# Patient Record
Sex: FEMALE | Race: BLACK OR AFRICAN AMERICAN | Employment: UNEMPLOYED | URBAN - METROPOLITAN AREA
[De-identification: names, ages, dates, MRNs, and addresses within clinical notes are randomized per-mention and may not be internally consistent; named-entity substitution may affect disease eponyms.]

---

## 2019-02-11 ENCOUNTER — OFFICE VISIT (OUTPATIENT)
Dept: NEUROSURGERY | Age: 59
End: 2019-02-11
Payer: COMMERCIAL

## 2019-02-11 VITALS
HEART RATE: 83 BPM | SYSTOLIC BLOOD PRESSURE: 136 MMHG | BODY MASS INDEX: 32.37 KG/M2 | WEIGHT: 177 LBS | RESPIRATION RATE: 20 BRPM | DIASTOLIC BLOOD PRESSURE: 83 MMHG

## 2019-02-11 DIAGNOSIS — M47.26 OTHER SPONDYLOSIS WITH RADICULOPATHY, LUMBAR REGION: Primary | ICD-10-CM

## 2019-02-11 PROCEDURE — 4004F PT TOBACCO SCREEN RCVD TLK: CPT | Performed by: NEUROLOGICAL SURGERY

## 2019-02-11 PROCEDURE — 3017F COLORECTAL CA SCREEN DOC REV: CPT | Performed by: NEUROLOGICAL SURGERY

## 2019-02-11 PROCEDURE — G8484 FLU IMMUNIZE NO ADMIN: HCPCS | Performed by: NEUROLOGICAL SURGERY

## 2019-02-11 PROCEDURE — G8419 CALC BMI OUT NRM PARAM NOF/U: HCPCS | Performed by: NEUROLOGICAL SURGERY

## 2019-02-11 PROCEDURE — G8427 DOCREV CUR MEDS BY ELIG CLIN: HCPCS | Performed by: NEUROLOGICAL SURGERY

## 2019-02-11 PROCEDURE — 99203 OFFICE O/P NEW LOW 30 MIN: CPT | Performed by: NEUROLOGICAL SURGERY

## 2019-11-10 ENCOUNTER — OFFICE VISIT (OUTPATIENT)
Dept: PRIMARY CARE CLINIC | Age: 59
End: 2019-11-10
Payer: COMMERCIAL

## 2019-11-10 VITALS
SYSTOLIC BLOOD PRESSURE: 120 MMHG | OXYGEN SATURATION: 99 % | BODY MASS INDEX: 32.76 KG/M2 | DIASTOLIC BLOOD PRESSURE: 77 MMHG | HEART RATE: 66 BPM | HEIGHT: 62 IN | TEMPERATURE: 97.9 F | WEIGHT: 178 LBS

## 2019-11-10 DIAGNOSIS — N30.00 ACUTE CYSTITIS WITHOUT HEMATURIA: Primary | ICD-10-CM

## 2019-11-10 LAB
BILIRUBIN, POC: NEGATIVE
BLOOD URINE, POC: NEGATIVE
CLARITY, POC: ABNORMAL
COLOR, POC: YELLOW
GLUCOSE URINE, POC: NEGATIVE
KETONES, POC: NEGATIVE
LEUKOCYTE EST, POC: NEGATIVE
NITRITE, POC: NEGATIVE
PH, POC: 7
PROTEIN, POC: ABNORMAL
SPECIFIC GRAVITY, POC: 1.02
UROBILINOGEN, POC: NEGATIVE

## 2019-11-10 PROCEDURE — 81003 URINALYSIS AUTO W/O SCOPE: CPT | Performed by: FAMILY MEDICINE

## 2019-11-10 PROCEDURE — G8417 CALC BMI ABV UP PARAM F/U: HCPCS | Performed by: FAMILY MEDICINE

## 2019-11-10 PROCEDURE — G8484 FLU IMMUNIZE NO ADMIN: HCPCS | Performed by: FAMILY MEDICINE

## 2019-11-10 PROCEDURE — 3017F COLORECTAL CA SCREEN DOC REV: CPT | Performed by: FAMILY MEDICINE

## 2019-11-10 PROCEDURE — 99213 OFFICE O/P EST LOW 20 MIN: CPT | Performed by: FAMILY MEDICINE

## 2019-11-10 PROCEDURE — G8427 DOCREV CUR MEDS BY ELIG CLIN: HCPCS | Performed by: FAMILY MEDICINE

## 2019-11-10 PROCEDURE — 1036F TOBACCO NON-USER: CPT | Performed by: FAMILY MEDICINE

## 2019-11-10 RX ORDER — FLUTICASONE PROPIONATE 50 MCG
SPRAY, SUSPENSION (ML) NASAL
COMMUNITY

## 2019-11-10 RX ORDER — LANOLIN ALCOHOL/MO/W.PET/CERES
CREAM (GRAM) TOPICAL
COMMUNITY
Start: 2019-10-17

## 2019-11-10 RX ORDER — GLIMEPIRIDE 2 MG/1
TABLET ORAL
COMMUNITY
Start: 2019-08-18

## 2019-11-10 RX ORDER — PAROXETINE 10 MG/1
TABLET, FILM COATED ORAL
COMMUNITY
Start: 2019-10-10

## 2019-11-10 RX ORDER — POLYETHYLENE GLYCOL 3350 17 G/17G
POWDER, FOR SOLUTION ORAL
COMMUNITY

## 2019-11-10 RX ORDER — TRAZODONE HYDROCHLORIDE 50 MG/1
TABLET ORAL
COMMUNITY
Start: 2019-10-10

## 2019-11-10 RX ORDER — PHENAZOPYRIDINE HYDROCHLORIDE 100 MG/1
100 TABLET, FILM COATED ORAL 3 TIMES DAILY PRN
Qty: 6 TABLET | Refills: 0 | Status: SHIPPED | OUTPATIENT
Start: 2019-11-10 | End: 2019-11-12

## 2019-11-10 RX ORDER — SULFAMETHOXAZOLE AND TRIMETHOPRIM 800; 160 MG/1; MG/1
1 TABLET ORAL 2 TIMES DAILY
Qty: 20 TABLET | Refills: 0 | Status: SHIPPED | OUTPATIENT
Start: 2019-11-10 | End: 2019-11-20

## 2019-11-10 RX ORDER — FLUCONAZOLE 150 MG/1
TABLET ORAL
Qty: 2 TABLET | Refills: 1 | Status: SHIPPED | OUTPATIENT
Start: 2019-11-10 | End: 2019-12-04

## 2019-11-10 ASSESSMENT — ENCOUNTER SYMPTOMS
RESPIRATORY NEGATIVE: 1
EYES NEGATIVE: 1
GASTROINTESTINAL NEGATIVE: 1
ALLERGIC/IMMUNOLOGIC NEGATIVE: 1

## 2019-11-10 ASSESSMENT — PATIENT HEALTH QUESTIONNAIRE - PHQ9
2. FEELING DOWN, DEPRESSED OR HOPELESS: 0
SUM OF ALL RESPONSES TO PHQ QUESTIONS 1-9: 0
1. LITTLE INTEREST OR PLEASURE IN DOING THINGS: 0
SUM OF ALL RESPONSES TO PHQ QUESTIONS 1-9: 0
SUM OF ALL RESPONSES TO PHQ9 QUESTIONS 1 & 2: 0

## 2019-12-04 ENCOUNTER — OFFICE VISIT (OUTPATIENT)
Dept: PRIMARY CARE CLINIC | Age: 59
End: 2019-12-04
Payer: COMMERCIAL

## 2019-12-04 VITALS
HEART RATE: 88 BPM | WEIGHT: 176 LBS | BODY MASS INDEX: 32.19 KG/M2 | SYSTOLIC BLOOD PRESSURE: 138 MMHG | TEMPERATURE: 98.2 F | DIASTOLIC BLOOD PRESSURE: 82 MMHG

## 2019-12-04 DIAGNOSIS — Z91.89 AT RISK FOR SIDE EFFECT OF MEDICATION: ICD-10-CM

## 2019-12-04 DIAGNOSIS — L02.412 ABSCESS OF LEFT AXILLA: Primary | ICD-10-CM

## 2019-12-04 PROCEDURE — G8417 CALC BMI ABV UP PARAM F/U: HCPCS | Performed by: NURSE PRACTITIONER

## 2019-12-04 PROCEDURE — G8484 FLU IMMUNIZE NO ADMIN: HCPCS | Performed by: NURSE PRACTITIONER

## 2019-12-04 PROCEDURE — 3017F COLORECTAL CA SCREEN DOC REV: CPT | Performed by: NURSE PRACTITIONER

## 2019-12-04 PROCEDURE — 99202 OFFICE O/P NEW SF 15 MIN: CPT | Performed by: NURSE PRACTITIONER

## 2019-12-04 PROCEDURE — G8427 DOCREV CUR MEDS BY ELIG CLIN: HCPCS | Performed by: NURSE PRACTITIONER

## 2019-12-04 PROCEDURE — 1036F TOBACCO NON-USER: CPT | Performed by: NURSE PRACTITIONER

## 2019-12-04 RX ORDER — DOXYCYCLINE HYCLATE 100 MG
100 TABLET ORAL 2 TIMES DAILY
Qty: 14 TABLET | Refills: 0 | Status: SHIPPED | OUTPATIENT
Start: 2019-12-04 | End: 2019-12-11

## 2019-12-04 RX ORDER — FLUCONAZOLE 150 MG/1
150 TABLET ORAL ONCE
Qty: 1 TABLET | Refills: 0 | Status: SHIPPED | OUTPATIENT
Start: 2019-12-04 | End: 2019-12-04

## 2019-12-04 ASSESSMENT — ENCOUNTER SYMPTOMS
SORE THROAT: 0
VOMITING: 0
ABDOMINAL PAIN: 0
NAUSEA: 0
SHORTNESS OF BREATH: 0
SINUS PAIN: 0
COUGH: 0
DIARRHEA: 0

## 2019-12-30 ENCOUNTER — HOSPITAL ENCOUNTER (OUTPATIENT)
Dept: GENERAL RADIOLOGY | Age: 59
Discharge: HOME | End: 2020-01-01
Payer: COMMERCIAL

## 2019-12-30 ENCOUNTER — OFFICE VISIT (OUTPATIENT)
Dept: ORTHOPEDIC SURGERY | Age: 59
End: 2019-12-30
Payer: COMMERCIAL

## 2019-12-30 ENCOUNTER — OFFICE VISIT (OUTPATIENT)
Dept: PRIMARY CARE CLINIC | Age: 59
End: 2019-12-30
Payer: COMMERCIAL

## 2019-12-30 ENCOUNTER — HOSPITAL ENCOUNTER (OUTPATIENT)
Age: 59
Discharge: HOME | End: 2020-01-01
Payer: COMMERCIAL

## 2019-12-30 VITALS
SYSTOLIC BLOOD PRESSURE: 94 MMHG | WEIGHT: 173 LBS | HEART RATE: 62 BPM | DIASTOLIC BLOOD PRESSURE: 66 MMHG | BODY MASS INDEX: 31.83 KG/M2 | TEMPERATURE: 98.1 F | HEIGHT: 62 IN

## 2019-12-30 VITALS — WEIGHT: 173.06 LBS | BODY MASS INDEX: 31.85 KG/M2 | HEIGHT: 62 IN

## 2019-12-30 DIAGNOSIS — Z12.31 ENCOUNTER FOR SCREENING MAMMOGRAM FOR BREAST CANCER: ICD-10-CM

## 2019-12-30 DIAGNOSIS — S92.424A CLOSED NONDISPLACED FRACTURE OF DISTAL PHALANX OF RIGHT GREAT TOE, INITIAL ENCOUNTER: Primary | ICD-10-CM

## 2019-12-30 DIAGNOSIS — Z13.220 SCREENING FOR HYPERLIPIDEMIA: ICD-10-CM

## 2019-12-30 DIAGNOSIS — M79.674 PAIN OF RIGHT GREAT TOE: Primary | ICD-10-CM

## 2019-12-30 DIAGNOSIS — Z12.11 SCREENING FOR COLON CANCER: ICD-10-CM

## 2019-12-30 PROCEDURE — G8417 CALC BMI ABV UP PARAM F/U: HCPCS | Performed by: INTERNAL MEDICINE

## 2019-12-30 PROCEDURE — 1036F TOBACCO NON-USER: CPT | Performed by: INTERNAL MEDICINE

## 2019-12-30 PROCEDURE — 99203 OFFICE O/P NEW LOW 30 MIN: CPT | Performed by: STUDENT IN AN ORGANIZED HEALTH CARE EDUCATION/TRAINING PROGRAM

## 2019-12-30 PROCEDURE — 73630 X-RAY EXAM OF FOOT: CPT

## 2019-12-30 PROCEDURE — G8427 DOCREV CUR MEDS BY ELIG CLIN: HCPCS | Performed by: INTERNAL MEDICINE

## 2019-12-30 PROCEDURE — 3017F COLORECTAL CA SCREEN DOC REV: CPT | Performed by: INTERNAL MEDICINE

## 2019-12-30 PROCEDURE — G8484 FLU IMMUNIZE NO ADMIN: HCPCS | Performed by: INTERNAL MEDICINE

## 2019-12-30 PROCEDURE — 99202 OFFICE O/P NEW SF 15 MIN: CPT | Performed by: INTERNAL MEDICINE

## 2019-12-30 NOTE — PROGRESS NOTES
[facility]  [facility]  [address]  [place]  Dept: [phone]    Ambulatory Orthopedic Office Visit, NEW PATIENT      CHIEF COMPLAINT:    Chief Complaint   Patient presents with    Toe Injury     right big toe DOI 19       HISTORY OF PRESENT ILLNESS:      The patient is a 61 y. o.female who is being seen at the request of  Anuel Nichole MD for consultation and evaluation of  Right great toe fracture. Pt hit toe early this morning while walking around house in dark. Pain controlled to great toe currently. XRs at urgent  show nondisplaced 1st proximal phalanx fracture. Pt denies pain elsewhere. Past Medical History:    Past Medical History:   Diagnosis Date    Asthma     Back pain     Diabetes mellitus ([address]     GERD (gastroesophageal reflux disease)     Glaucoma     Hyperlipidemia     Hypertension     Osteoarthritis        Past Surgical History:    Past Surgical History:   Procedure Laterality Date    APPENDECTOMY      BACK SURGERY      HYSTERECTOMY      JOINT REPLACEMENT Bilateral     knee x2 each    LUMBAR FUSION       [facility]     TONSILLECTOMY         Current Medications:   Current Outpatient Medications   Medication Sig Dispense Refill    vitamin B-12 (CYANOCOBALAMIN) 1000 MCG tablet       ferrous sulfate 325 (65 Fe) MG EC tablet       fluticasone (FLONASE) 50 MCG/ACT nasal spray fluticasone propionate 50 mcg/actuation nasal spray,suspension      metFORMIN (GLUCOPHAGE) 500 MG tablet       PARoxetine (PAXIL) 10 MG tablet       polyethylene glycol (GAVILAX) powder Gavilax 17 gram/dose oral powder      timolol (TIMOPTIC) 0.25 % ophthalmic solution       traZODone (DESYREL) 50 MG tablet       albuterol (PROVENTIL) (5 MG/ML) 0.5% nebulizer solution Take 2.5 mg by nebulization every 6 hours as needed for Wheezing.  docusate sodium (COLACE) 100 MG capsule Take 100 mg by mouth daily.       losartan-hydrochlorothiazide (HYZAAR) 100-25 MG per tablet Take 1 tablet by mouth daily.  latanoprost (XALATAN) 0.005 % ophthalmic solution 1 drop nightly.  atorvastatin (LIPITOR) 20 MG tablet Take 20 mg by mouth daily.  amLODIPine (NORVASC) 5 MG tablet Take 5 mg by mouth daily.  oxyCODONE-acetaminophen (PERCOCET) 5-325 MG per tablet Take 1 tablet by mouth every 6 hours as needed for Pain.  Budesonide-Formoterol Fumarate (SYMBICORT IN) Inhale  into the lungs daily as needed. No current facility-administered medications for this visit. Allergies:    Ace inhibitors; Adhesive tape;  Chocolate; and Compazine [prochlorperazine maleate]    Social History:   Social History     Socioeconomic History    Marital status:      Spouse name: Not on file    Number of children: Not on file    Years of education: Not on file    Highest education level: Not on file   Occupational History    Not on file   Social Needs    Financial resource strain: Not on file    Food insecurity:     Worry: Not on file     Inability: Not on file    Transportation needs:     Medical: Not on file     Non-medical: Not on file   Tobacco Use    Smoking status: Former Smoker     Packs/day: 0.25     Years: 10.00     Pack years: 2.50     Types: Cigarettes     Last attempt to quit: 2015     Years since quittin.0    Smokeless tobacco: Never Used   Substance and Sexual Activity    Alcohol use: No    Drug use: Not on file    Sexual activity: Yes     Partners: Male   Lifestyle    Physical activity:     Days per week: Not on file     Minutes per session: Not on file    Stress: Not on file   Relationships    Social connections:     Talks on phone: Not on file     Gets together: Not on file     Attends Sikh service: Not on file     Active member of club or organization: Not on file     Attends meetings of clubs or organizations: Not on file     Relationship status: Not on file    Intimate partner violence:     Fear of current or ex partner: Not on file     Emotionally abused: Not on file     Physically abused: Not on file     Forced sexual activity: Not on file   Other Topics Concern    Not on file   Social History Narrative    Not on file       Family History:  No family history on file. REVIEW OF SYSTEMS:    Constitutional: Negative for fever and chills. HENT: Negative for congestion or drainage   Eyes: Negative for blurred vision and double vision. Respiratory: Negative for cough, shortnessof breath and wheezing. Cardiovascular: Negative for chest pain and palpitations. Gastrointestinal: Negative for nausea. Negative for vomiting. Musculoskeletal: Positive for myalgias and joint pain. Skin:Negative for itching and rash. Neurological: Negative for dizziness, sensory change and headaches. Psychiatric/Behavioral: Negative for depression and suicidal ideas. PHYSICAL EXAM:  Ht 5' 1.69\" (1.567 m)   Wt 173 lb 1 oz (78.5 kg)   BMI 31.97 kg/m²   Physical Exam  Gen: alert and oriented  Psych:  Appropriate affect; Appropriate knowledge base; Appropriate mood; No hallucinations; Head: normocephalic atraumatic   Cardiovascular:Regular rate, no dependent edema, distal pulses 2+  Respiratory: Chest symmetric, no accessory muscle use, normal respirations  Ortho Exam  R foot: pain swelling to proximal right great toe. Compartments soft. 2+ DP pulse. TA/EHL/FHL/GS motor intact. Deep and Superficial Peroneal/Saphenous/Sural SILT. Radiology:   -XRs reviewed outside facility nondisplaced right 1st proximal phalanx fracture    ASSESSMENT:     1. Closed nondisplaced fracture of proximal phalanx of right great toe, initial encounter         PLAN:     -Hard to shoe  -WBAT  -Ice/elevation  -F/u 2 weeks    A  was ordered and placed on the patient for pain control and stabilization due to right great toe fracture.   Patient is ambulatory with weakness and instability therefore a post op shoe will help with the weakness,

## 2020-01-20 ENCOUNTER — OFFICE VISIT (OUTPATIENT)
Dept: ORTHOPEDIC SURGERY | Age: 60
End: 2020-01-20
Payer: COMMERCIAL

## 2020-01-20 VITALS — BODY MASS INDEX: 32.1 KG/M2 | HEIGHT: 61 IN | WEIGHT: 170 LBS

## 2020-01-20 PROCEDURE — 99213 OFFICE O/P EST LOW 20 MIN: CPT | Performed by: ORTHOPAEDIC SURGERY

## 2020-01-20 NOTE — PROGRESS NOTES
right great toe pain. Skin: Negative for itching and rash. Neurological: Negative for dizziness, sensory change and headaches. Psychiatric/Behavioral: Negative for depression and suicidal ideas. I have reviewed the CC, HPI, ROS, PMH, FHX, Social History. I agree with the documentation provided by other staff, residents, and/or medical students and have reviewed their documentation prior to providing my signature indicating agreement. Objective :   General: AAOx3, NAD, appears appropriate stated age  Ortho Exam  RLE-no significant deformity noted at the right big toe. Skin is intact. Tender to palpation about the great toe. Compartments are soft and compressible. EHL/FHL/TA/GS complex motor intact with 5/5 strength. Sural, saphenous, superificial/deep peroneal, and plantar nerve distribution SILT. Dorsalis pedis/posterior tibial pulses 2+ with BCR. CV: no obvious JVD, no dependent edema, distal pulses 2+  Respiratory: chest rise symmetric, unlabored respirations, no audible wheezing  Skin: warm, well perfused, no obvious rashes or lesions  Psych: Patient displays understanding of exam, diagnosis, and plan. Radiology:   History:   Right foot first proximal phalanx fracture    Comparison:   12/30/2019    Findings:   3 x-ray views of the right foot demonstrate acute nondisplaced fracture at the medial portion of the base of the proximal first phalanx. There seems to be no evidence of displacement from prior imaging. No evidence of subluxation or dislocation or any osseous abnormalities. Impression:  Stable fracture at the base of the first proximal phalanx of the right foot    Assessment:      1. Closed nondisplaced fracture of proximal phalanx of right great toe, initial encounter         Plan:      -Hard sole shoe replaced with walking boot for added comfort while ambulating  -Advised to add Motrin for further anti-inflammatory control.  Patient states she will take over the counter  -Discussed with the patient that this type of fracture pattern can be significantly painful. We counseled her that fractures may take a couple months to completely heal.  She displayed understanding of this information.  -Follow up with us in 2 weeks for repeat evaluation with xrays    Follow up:Return in about 2 weeks (around 2/3/2020) for re-evalation with xrays. No orders of the defined types were placed in this encounter. No orders of the defined types were placed in this encounter.       Rickey Desai DO  Orthopedic Surgery Resident, PGY-1  [facility]

## 2020-02-03 ENCOUNTER — OFFICE VISIT (OUTPATIENT)
Dept: ORTHOPEDIC SURGERY | Age: 60
End: 2020-02-03
Payer: COMMERCIAL

## 2020-02-03 VITALS — HEIGHT: 61 IN | WEIGHT: 170 LBS | BODY MASS INDEX: 32.1 KG/M2

## 2020-02-03 PROCEDURE — 99213 OFFICE O/P EST LOW 20 MIN: CPT | Performed by: STUDENT IN AN ORGANIZED HEALTH CARE EDUCATION/TRAINING PROGRAM

## 2020-02-03 ASSESSMENT — ENCOUNTER SYMPTOMS
VOMITING: 0
SHORTNESS OF BREATH: 0
NAUSEA: 0

## 2020-02-25 ENCOUNTER — OFFICE VISIT (OUTPATIENT)
Dept: ORTHOPEDIC SURGERY | Age: 60
End: 2020-02-25
Payer: COMMERCIAL

## 2020-02-25 VITALS — WEIGHT: 173 LBS | HEIGHT: 62 IN | BODY MASS INDEX: 31.83 KG/M2

## 2020-02-25 PROBLEM — M43.10 ACQUIRED SPONDYLOLISTHESIS: Status: ACTIVE | Noted: 2020-02-25

## 2020-02-25 PROBLEM — Z98.1 HISTORY OF LUMBAR SPINAL FUSION: Status: ACTIVE | Noted: 2020-02-25

## 2020-02-25 PROBLEM — M54.50 CHRONIC LOW BACK PAIN: Status: ACTIVE | Noted: 2020-02-25

## 2020-02-25 PROBLEM — G89.29 CHRONIC LOW BACK PAIN: Status: ACTIVE | Noted: 2020-02-25

## 2020-02-25 PROCEDURE — G8417 CALC BMI ABV UP PARAM F/U: HCPCS | Performed by: ORTHOPAEDIC SURGERY

## 2020-02-25 PROCEDURE — G8484 FLU IMMUNIZE NO ADMIN: HCPCS | Performed by: ORTHOPAEDIC SURGERY

## 2020-02-25 PROCEDURE — G8427 DOCREV CUR MEDS BY ELIG CLIN: HCPCS | Performed by: ORTHOPAEDIC SURGERY

## 2020-02-25 PROCEDURE — 3017F COLORECTAL CA SCREEN DOC REV: CPT | Performed by: ORTHOPAEDIC SURGERY

## 2020-02-25 PROCEDURE — 1036F TOBACCO NON-USER: CPT | Performed by: ORTHOPAEDIC SURGERY

## 2020-02-25 PROCEDURE — 99203 OFFICE O/P NEW LOW 30 MIN: CPT | Performed by: ORTHOPAEDIC SURGERY

## 2020-02-25 NOTE — PROGRESS NOTES
Patient ID: Vanessa Garibay is a 61 y.o. female    Chief Compliant:  Chief Complaint   Patient presents with    Established New Doctor    Pain     low back and mid back pain        History of Present Illness:    61 y.o. female presents for evaluation of chronic low back pain. She notes that she gets flare ups intermittently that increase her pain. She reports pain is exacerbated with standing and walking. She notes that sitting down can relieve the pain. She notes that when the pain is at its worst she has pain that goes down the posterior aspect of her legs. She has history of L4-5 fusion in 2015 that provided relief for about a year. She now reports low back pain and thoracic back pain. She reports that she had epidural steroid injection about one month ago per Dr. Reena Smith that did not provided any relief. She has tried physical therapy and is currently completing aquatic therapy with last session 2/24/20. Patient presents with chronic low back pain intermittent severe aggravations intermittent radicular leg pain; positive history of neurogenic claudication aggravation with standing and/or walking greater than 20 minutes relief with sitting down. Patient with history of L4-5 PLIF required revision patient reports this was done in 2015 with relief of pain for a year or 2    Review of Systems   Constitutional: Negative for fever, chills, sweats, or weight loss. Eyes: Negative for changes in vision, pain. HENT: Negative for congestion, bleeding, or pain. Respiratory/Cardio: Negative for chest pain, SOB. Gastrointestinal:  Negative for abdominal pain, Nausea/vomiting/diarrhea. Genitourinary: Negative for any urinary symptoms. Neurological: Negative for paresthesia, asthenia. Integumentary: Negative for rash, wounds, itching, ecchymosis.    Musculoskeletal: Positive for Established New Doctor and Pain (low back and mid back pain)     Past History:    Current Outpatient Medications:     vitamin B-12 Neurological: Patient is alert and oriented to person, place, and time. Normal strenght. No sensory deficit. Skin: Skin is warm and dry  Psychiatric: Behavior is normal. Thought content normal.  Nursing note and vitals reviewed. Labs and Imaging:     XR taken today:  Xr Lumbar Spine (2-3 Views)    Result Date: 2/25/2020  AP and lateral lumbar spine status post L4-5 PLIF excellent graft hardware position ankylosed mild retrolisthesis L2-3 associated with L2-3 degenerative disc disease    Xr Lumbar Spine Flexion And Extension Only    Result Date: 2/25/2020  Lateral flexion and extension lumbar spine history of L4-5 PLIF ankylosed excellent position L2-3 5 mm retrolisthesis without significant/pathologic motion on flexion and extension; although increased slightly with respect to supine film    Outside MRI is reviewed patient with well decompressed fused L4-5 mild retrolisthesis L2-3     assessment and Plan:  1. Chronic low back pain, unspecified back pain laterality, unspecified whether sciatica present    2. Acquired spondylolisthesis    3. History of lumbar spinal fusion        61 y.o. female presents for evaluation of low back pain. She has history of L4-5 fusion in 2015. She has acute on chronic pain for the past couple years with pain radiating to her posterior legs. She has tried physical therapy and injections that have failed. She finishes aquatic physical therapy on 3/11/20. I spoke with her about retrieving her films from [address] to help further assess her symptoms. Follow up in 4 weeks.          Patient with low back pain neurogenic claudication intermittent radicular pain    History of L4-5 PLIF with relief of pain for a year or 2    MRI reveals some foraminal stenosis at L2-3 associated with a retrolisthesis history of L4-5 PLIF well decompressed and fused    Finish physical therapy the patient just started    Follow-up 4 weeks with imaging from [address] reassess L2-3 retrolisthesis    Provider Attestation:  [employer] Beeks, personally performed the services described in this documentation. All medical record entries made by the scribe were at my direction and in my presence. I have reviewed the chart and discharge instructions and agree that the records reflect my personal performance and is accurate and complete. Naldo Schumacher MD 02/25/20         Scribe Attestation:  By signing my name below, Lucienkatrin Lal, attest that this documentation has been prepared under the direction and in the presence of Dr. Taco Garcia. Electronically signed: Ira Summers, 2/25/20     Please note that this chart was generated using voice recognition Dragon dictation software. Although every effort was made to ensure the accuracy of this automated transcription, some errors in transcription may have occurred.

## 2020-02-28 ENCOUNTER — HOSPITAL ENCOUNTER (OUTPATIENT)
Age: 60
Discharge: HOME | End: 2020-03-01
Payer: COMMERCIAL

## 2020-02-28 ENCOUNTER — HOSPITAL ENCOUNTER (OUTPATIENT)
Dept: GENERAL RADIOLOGY | Age: 60
Discharge: HOME | End: 2020-03-01
Payer: COMMERCIAL

## 2020-02-28 ENCOUNTER — OFFICE VISIT (OUTPATIENT)
Dept: PRIMARY CARE CLINIC | Age: 60
End: 2020-02-28
Payer: COMMERCIAL

## 2020-02-28 VITALS
BODY MASS INDEX: 33.35 KG/M2 | SYSTOLIC BLOOD PRESSURE: 141 MMHG | WEIGHT: 179.4 LBS | HEART RATE: 78 BPM | DIASTOLIC BLOOD PRESSURE: 84 MMHG | OXYGEN SATURATION: 98 % | TEMPERATURE: 98.1 F

## 2020-02-28 PROCEDURE — G8417 CALC BMI ABV UP PARAM F/U: HCPCS | Performed by: NURSE PRACTITIONER

## 2020-02-28 PROCEDURE — 3017F COLORECTAL CA SCREEN DOC REV: CPT | Performed by: NURSE PRACTITIONER

## 2020-02-28 PROCEDURE — 99213 OFFICE O/P EST LOW 20 MIN: CPT | Performed by: NURSE PRACTITIONER

## 2020-02-28 PROCEDURE — G8484 FLU IMMUNIZE NO ADMIN: HCPCS | Performed by: NURSE PRACTITIONER

## 2020-02-28 PROCEDURE — G8427 DOCREV CUR MEDS BY ELIG CLIN: HCPCS | Performed by: NURSE PRACTITIONER

## 2020-02-28 PROCEDURE — 1036F TOBACCO NON-USER: CPT | Performed by: NURSE PRACTITIONER

## 2020-02-28 PROCEDURE — 73030 X-RAY EXAM OF SHOULDER: CPT

## 2020-02-28 RX ORDER — CYCLOBENZAPRINE HCL 10 MG
10 TABLET ORAL 3 TIMES DAILY PRN
Qty: 21 TABLET | Refills: 0 | Status: SHIPPED | OUTPATIENT
Start: 2020-02-28 | End: 2020-03-09

## 2020-02-28 ASSESSMENT — PATIENT HEALTH QUESTIONNAIRE - PHQ9
2. FEELING DOWN, DEPRESSED OR HOPELESS: 0
SUM OF ALL RESPONSES TO PHQ QUESTIONS 1-9: 0
SUM OF ALL RESPONSES TO PHQ QUESTIONS 1-9: 0
1. LITTLE INTEREST OR PLEASURE IN DOING THINGS: 0
SUM OF ALL RESPONSES TO PHQ9 QUESTIONS 1 & 2: 0

## 2020-02-28 ASSESSMENT — ENCOUNTER SYMPTOMS
SHORTNESS OF BREATH: 0
NAUSEA: 0
SINUS PAIN: 0
ABDOMINAL PAIN: 0
VOMITING: 0
COUGH: 0
SORE THROAT: 0
DIARRHEA: 0

## 2020-02-28 NOTE — PATIENT INSTRUCTIONS
Patient Education        Shoulder Sprain: Care Instructions  Your Care Instructions    A shoulder sprain occurs when you stretch or tear a ligament in your shoulder. Ligaments are tough tissues that connect one bone to another. A sprain can happen during sports, a fall, or projects around the house. Shoulder sprains usually get better with treatment at home. Follow-up care is a key part of your treatment and safety. Be sure to make and go to all appointments, and call your doctor if you are having problems. It's also a good idea to know your test results and keep a list of the medicines you take. How can you care for yourself at home? · Rest and protect your shoulder. Try to stop or reduce any action that causes pain. · If your doctor gave you a sling or immobilizer, wear it as directed. A sling or immobilizer supports your shoulder and may make you more comfortable. · Put ice or a cold pack on your shoulder for 10 to 20 minutes at a time. Try to do this every 1 to 2 hours for the next 3 days (when you are awake) or until the swelling goes down. Put a thin cloth between the ice and your skin. Some doctors suggest alternating between hot and cold. · Be safe with medicines. Read and follow all instructions on the label. ? If the doctor gave you a prescription medicine for pain, take it as prescribed. ? If you are not taking a prescription pain medicine, ask your doctor if you can take an over-the-counter medicine. · For the first day or two after an injury, avoid things that might increase swelling, such as hot showers, hot tubs, or hot packs. · After 2 or 3 days, if your swelling is gone, apply a heating pad set on low or a warm cloth to your shoulder. This helps keep your shoulder flexible. Some doctors suggest that you go back and forth between hot and cold. Put a thin cloth between the heating pad and your skin. · Follow your doctor's or physical therapist's directions for exercises.   · Return to your usual level of activity slowly. When should you call for help? Call your doctor now or seek immediate medical care if:    · Your pain is worse.     · You cannot move your shoulder.     · Your arm is cool or pale or changes color below the shoulder.     · You have tingling, weakness, or numbness in your arm.    Watch closely for changes in your health, and be sure to contact your doctor if:    · You do not get better as expected. Where can you learn more? Go to https://Planex.[email or web address]. org and sign in to your [employer] account. Enter N302 in the [employer] box to learn more about \"Shoulder Sprain: Care Instructions. \"     If you do not have an account, please click on the \"Sign Up Now\" link. Current as of: June 26, 2019  Content Version: 12.3  © 7608-6994 Healthwise, Incorporated. Care instructions adapted under license by [facility] ([facility]). If you have questions about a medical condition or this instruction, always ask your healthcare professional. [place] [address] any warranty or liability for your use of this information.

## 2020-02-28 NOTE — PROGRESS NOTES
[address] [facility]  [address] [facility] [address]  Dept: [phone]  Dept Fax: [phone]    Kash Vilchis is a 61 y.o. female who presents to the urgent care today for her medicalconditions/complaints as noted below. Kash Vilchis is c/o of Neck Pain and Shoulder Pain (RT side, onset was 4-5 days ago, decreased ROM)      HPI:       63-year-old female patient presents with complaint of right-sided neck and shoulder pain. Patient reports she has had onset of pain over the past 6 days. Patient reports that she recently started water exercise therapy regarding her chronic back pain. Patient currently takes a daily dose of Percocet regarding chronic back pain. Patient denies previous history of neck or shoulder problems. Denies any acute injury or trauma. Denies numbness tingling going down the arms. Describes diminished range of motion with the right arm. Denies any weakness. Relieving factors include none. Worsening factors include none. Past Medical History:   Diagnosis Date    Asthma     Back pain     Diabetes mellitus (HCC)     GERD (gastroesophageal reflux disease)     Glaucoma     Hyperlipidemia     Hypertension     Osteoarthritis         Current Outpatient Medications   Medication Sig Dispense Refill    cyclobenzaprine (FLEXERIL) 10 MG tablet Take 1 tablet by mouth 3 times daily as needed for Muscle spasms 21 tablet 0    fluticasone (FLONASE) 50 MCG/ACT nasal spray fluticasone propionate 50 mcg/actuation nasal spray,suspension      metFORMIN (GLUCOPHAGE) 500 MG tablet       PARoxetine (PAXIL) 10 MG tablet       polyethylene glycol (GAVILAX) powder Gavilax 17 gram/dose oral powder      timolol (TIMOPTIC) 0.25 % ophthalmic solution       traZODone (DESYREL) 50 MG tablet       albuterol (PROVENTIL) (5 MG/ML) 0.5% nebulizer solution Take 2.5 mg by nebulization every 6 hours as needed for Wheezing.       losartan-hydrochlorothiazide (HYZAAR) 100-25 MG per tablet Take 1 tablet by mouth daily.  latanoprost (XALATAN) 0.005 % ophthalmic solution 1 drop nightly.  amLODIPine (NORVASC) 5 MG tablet Take 5 mg by mouth daily.  oxyCODONE-acetaminophen (PERCOCET) 5-325 MG per tablet Take 1 tablet by mouth every 6 hours as needed for Pain.  Budesonide-Formoterol Fumarate (SYMBICORT IN) Inhale  into the lungs daily as needed.  vitamin B-12 (CYANOCOBALAMIN) 1000 MCG tablet       ferrous sulfate 325 (65 Fe) MG EC tablet       docusate sodium (COLACE) 100 MG capsule Take 100 mg by mouth daily.  atorvastatin (LIPITOR) 20 MG tablet Take 20 mg by mouth daily. No current facility-administered medications for this visit. Allergies   Allergen Reactions    Ace Inhibitors     Adhesive Tape     Chocolate Hives    Compazine [Prochlorperazine Maleate] Swelling       Subjective:      Review of Systems   Constitutional: Negative for chills and fever. HENT: Negative for ear pain, sinus pain and sore throat. Respiratory: Negative for cough and shortness of breath. Cardiovascular: Negative for chest pain and palpitations. Gastrointestinal: Negative for abdominal pain, diarrhea, nausea and vomiting. Musculoskeletal: Positive for arthralgias (right shooulder). Neurological: Negative for dizziness and headaches. All other systems reviewed and are negative. Objective:     Physical Exam  Vitals signs and nursing note reviewed. Constitutional:       Appearance: Normal appearance. Cardiovascular:      Rate and Rhythm: Normal rate. Pulmonary:      Effort: Pulmonary effort is normal.      Breath sounds: Normal breath sounds. Musculoskeletal:      Right shoulder: She exhibits decreased range of motion, tenderness and bony tenderness. She exhibits no deformity. Cervical back: She exhibits no tenderness, no bony tenderness and no deformity (no step off).         Arms:    Skin: voiced understanding. This note was transcribed using dictation with Dragon services. Efforts were made to correct any errors but some words may be misinterpreted.      Electronically signed by MARLO Cerna CNP on 2/28/2020at 6:16 PM

## 2020-02-28 NOTE — LETTER
[address] [facility]  [facility] [address]  Phone: [phone]  Fax: [phone]    MARLO Betancourt CNP        February 28, 2020     Patient: Bridger Yadav   YOB: 1960   Date of Visit: 2/28/2020       To Whom It May Concern: It is my medical opinion that Kayleen Mendiola is excused from water therapy for one week. If you have any questions or concerns, please don't hesitate to call.     Sincerely,        MARLO Betancourt CNP

## 2020-05-08 ENCOUNTER — APPOINTMENT (OUTPATIENT)
Dept: GENERAL RADIOLOGY | Age: 60
End: 2020-05-08
Payer: COMMERCIAL

## 2020-05-08 ENCOUNTER — HOSPITAL ENCOUNTER (EMERGENCY)
Age: 60
Discharge: HOME | End: 2020-05-08
Attending: EMERGENCY MEDICINE
Payer: COMMERCIAL

## 2020-05-08 VITALS
TEMPERATURE: 98.4 F | HEART RATE: 81 BPM | OXYGEN SATURATION: 100 % | RESPIRATION RATE: 16 BRPM | SYSTOLIC BLOOD PRESSURE: 159 MMHG | DIASTOLIC BLOOD PRESSURE: 87 MMHG

## 2020-05-08 LAB
ABSOLUTE EOS #: 0.1 K/UL (ref 0–0.44)
ABSOLUTE IMMATURE GRANULOCYTE: <0.03 K/UL (ref 0–0.3)
ABSOLUTE LYMPH #: 1.59 K/UL (ref 1.1–3.7)
ABSOLUTE MONO #: 0.43 K/UL (ref 0.1–1.2)
ANION GAP SERPL CALCULATED.3IONS-SCNC: 13 MMOL/L (ref 9–17)
BASOPHILS # BLD: 0 % (ref 0–2)
BASOPHILS ABSOLUTE: <0.03 K/UL (ref 0–0.2)
BUN BLDV-MCNC: 18 MG/DL (ref 6–20)
BUN/CREAT BLD: NORMAL (ref 9–20)
C-REACTIVE PROTEIN: 4.7 MG/L (ref 0–5)
CALCIUM SERPL-MCNC: 10.4 MG/DL (ref 8.6–10.4)
CHLORIDE BLD-SCNC: 101 MMOL/L (ref 98–107)
CO2: 27 MMOL/L (ref 20–31)
CREAT SERPL-MCNC: 0.5 MG/DL (ref 0.5–0.9)
DIFFERENTIAL TYPE: ABNORMAL
EOSINOPHILS RELATIVE PERCENT: 2 % (ref 1–4)
GFR AFRICAN AMERICAN: >60 ML/MIN
GFR NON-AFRICAN AMERICAN: >60 ML/MIN
GFR SERPL CREATININE-BSD FRML MDRD: NORMAL ML/MIN/{1.73_M2}
GFR SERPL CREATININE-BSD FRML MDRD: NORMAL ML/MIN/{1.73_M2}
GLUCOSE BLD-MCNC: 86 MG/DL (ref 70–99)
HCT VFR BLD CALC: 40 % (ref 36.3–47.1)
HEMOGLOBIN: 12.3 G/DL (ref 11.9–15.1)
IMMATURE GRANULOCYTES: 0 %
LYMPHOCYTES # BLD: 33 % (ref 24–43)
MCH RBC QN AUTO: 26.6 PG (ref 25.2–33.5)
MCHC RBC AUTO-ENTMCNC: 30.8 G/DL (ref 28.4–34.8)
MCV RBC AUTO: 86.4 FL (ref 82.6–102.9)
MONOCYTES # BLD: 9 % (ref 3–12)
NRBC AUTOMATED: 0 PER 100 WBC
PDW BLD-RTO: 15.9 % (ref 11.8–14.4)
PLATELET # BLD: 397 K/UL (ref 138–453)
PLATELET ESTIMATE: ABNORMAL
PMV BLD AUTO: 9.9 FL (ref 8.1–13.5)
POTASSIUM SERPL-SCNC: 4.9 MMOL/L (ref 3.7–5.3)
RBC # BLD: 4.63 M/UL (ref 3.95–5.11)
RBC # BLD: ABNORMAL 10*6/UL
SEG NEUTROPHILS: 56 % (ref 36–65)
SEGMENTED NEUTROPHILS ABSOLUTE COUNT: 2.69 K/UL (ref 1.5–8.1)
SODIUM BLD-SCNC: 141 MMOL/L (ref 135–144)
WBC # BLD: 4.8 K/UL (ref 3.5–11.3)
WBC # BLD: ABNORMAL 10*3/UL

## 2020-05-08 PROCEDURE — 73562 X-RAY EXAM OF KNEE 3: CPT

## 2020-05-08 PROCEDURE — 93970 EXTREMITY STUDY: CPT

## 2020-05-08 PROCEDURE — 6370000000 HC RX 637 (ALT 250 FOR IP): Performed by: STUDENT IN AN ORGANIZED HEALTH CARE EDUCATION/TRAINING PROGRAM

## 2020-05-08 PROCEDURE — 86140 C-REACTIVE PROTEIN: CPT

## 2020-05-08 PROCEDURE — 85025 COMPLETE CBC W/AUTO DIFF WBC: CPT

## 2020-05-08 PROCEDURE — 99283 EMERGENCY DEPT VISIT LOW MDM: CPT

## 2020-05-08 PROCEDURE — 80048 BASIC METABOLIC PNL TOTAL CA: CPT

## 2020-05-08 RX ORDER — IBUPROFEN 800 MG/1
800 TABLET ORAL ONCE
Status: COMPLETED | OUTPATIENT
Start: 2020-05-08 | End: 2020-05-08

## 2020-05-08 RX ORDER — ACETAMINOPHEN 325 MG/1
650 TABLET ORAL EVERY 6 HOURS PRN
Qty: 30 TABLET | Refills: 0 | Status: SHIPPED | OUTPATIENT
Start: 2020-05-08

## 2020-05-08 RX ADMIN — IBUPROFEN 800 MG: 800 TABLET, FILM COATED ORAL at 11:47

## 2020-05-08 ASSESSMENT — PAIN DESCRIPTION - PAIN TYPE: TYPE: ACUTE PAIN

## 2020-05-08 ASSESSMENT — ENCOUNTER SYMPTOMS
SHORTNESS OF BREATH: 0
NAUSEA: 0
ABDOMINAL PAIN: 0
VOMITING: 0

## 2020-05-08 ASSESSMENT — PAIN SCALES - GENERAL
PAINLEVEL_OUTOF10: 10
PAINLEVEL_OUTOF10: 10

## 2020-05-08 ASSESSMENT — PAIN DESCRIPTION - DESCRIPTORS: DESCRIPTORS: ACHING

## 2020-05-08 ASSESSMENT — PAIN DESCRIPTION - PROGRESSION: CLINICAL_PROGRESSION: GRADUALLY WORSENING

## 2020-05-08 ASSESSMENT — PAIN DESCRIPTION - LOCATION: LOCATION: KNEE

## 2020-05-08 ASSESSMENT — PAIN DESCRIPTION - ONSET: ONSET: PROGRESSIVE

## 2020-05-08 ASSESSMENT — PAIN DESCRIPTION - ORIENTATION: ORIENTATION: LEFT

## 2020-05-08 NOTE — ED PROVIDER NOTES
fusion. Social History     Socioeconomic History    Marital status:      Spouse name: Not on file    Number of children: Not on file    Years of education: Not on file    Highest education level: Not on file   Occupational History    Not on file   Social Needs    Financial resource strain: Not on file    Food insecurity     Worry: Not on file     Inability: Not on file    Transportation needs     Medical: Not on file     Non-medical: Not on file   Tobacco Use    Smoking status: Former Smoker     Packs/day: 0.25     Years: 10.00     Pack years: 2.50     Types: Cigarettes     Last attempt to quit:      Years since quittin.3    Smokeless tobacco: Never Used   Substance and Sexual Activity    Alcohol use: No    Drug use: Not on file    Sexual activity: Yes     Partners: Male   Lifestyle    Physical activity     Days per week: Not on file     Minutes per session: Not on file    Stress: Not on file   Relationships    Social connections     Talks on phone: Not on file     Gets together: Not on file     Attends Gnosticist service: Not on file     Active member of club or organization: Not on file     Attends meetings of clubs or organizations: Not on file     Relationship status: Not on file    Intimate partner violence     Fear of current or ex partner: Not on file     Emotionally abused: Not on file     Physically abused: Not on file     Forced sexual activity: Not on file   Other Topics Concern    Not on file   Social History Narrative    Not on file       History reviewed. No pertinent family history. Allergies:  Ace inhibitors; Adhesive tape; Chocolate; and Compazine [prochlorperazine maleate]    Home Medications:  Prior to Admission medications    Medication Sig Start Date End Date Taking?  Authorizing Provider   diclofenac sodium (VOLTAREN) 1 % GEL Apply 2 g topically 2 times daily 20  Yes Faye Lopez MD   acetaminophen (TYLENOL) 325 MG tablet Take 2 tablets by mouth MEDICATIONS ORDERED:  Orders Placed This Encounter   Medications    ibuprofen (ADVIL;MOTRIN) tablet 800 mg    diclofenac sodium (VOLTAREN) 1 % GEL     Sig: Apply 2 g topically 2 times daily     Dispense:  1 Tube     Refill:  3    acetaminophen (TYLENOL) 325 MG tablet     Sig: Take 2 tablets by mouth every 6 hours as needed for Pain     Dispense:  30 tablet     Refill:  0       IMPRESSION:     ED Course as of May 08 [address]   Fri May 08, 2020       1233 Patient appears well, nontoxic, no acute distress. Patient is afebrile. Report of increased knee swelling on the left with history of prosthesis infection, status post I&D. Low suspicion for joint infection as knee appears mildly swollen, non-erythematous, no warmth,    [AD]   1233  normal range of motion with no abnormalities with gait. Distal pulses 2+. Due to patient's history, will obtain basic labs, CRP. Moderate concern for possible DVT as patient does complain of worsening swelling of left lower extremity and patient is antiphospholipid positive. She is not on anticoagulant medications and does not have a DVT history. Will obtain lower extremity Doppler ultrasound. X-ray pending of left knee as well. Dissipate discharge and outpatient follow-up.     [AD]      ED Course User Index  [AD] Leonora Reich MD       DIAGNOSTIC RESULTS / EMERGENCY DEPARTMENT COURSE / MDM     LABS:  Results for orders placed or performed during the hospital encounter of 05/08/20   CBC Auto Differential   Result Value Ref Range    WBC 4.8 3.5 - 11.3 k/uL    RBC 4.63 3.95 - 5.11 m/uL    Hemoglobin 12.3 11.9 - 15.1 g/dL    Hematocrit 40.0 36.3 - 47.1 %    MCV 86.4 82.6 - 102.9 fL    MCH 26.6 25.2 - 33.5 pg    MCHC 30.8 28.4 - 34.8 g/dL    RDW 15.9 (H) 11.8 - 14.4 %    Platelets 562 906 - 565 k/uL    MPV 9.9 8.1 - 13.5 fL    NRBC Automated 0.0 0.0 per 100 WBC    Differential Type NOT REPORTED     Seg Neutrophils 56 36 - 65 %    Lymphocytes 33 24 - 43 %    Monocytes 9 3 - 12 %    Eosinophils % 2 1 - 4 %    Basophils 0 0 - 2 %    Immature Granulocytes 0 0 %    Segs Absolute 2.69 1.50 - 8.10 k/uL    Absolute Lymph # 1.59 1.10 - 3.70 k/uL    Absolute Mono # 0.43 0.10 - 1.20 k/uL    Absolute Eos # 0.10 0.00 - 0.44 k/uL    Basophils Absolute <0.03 0.00 - 0.20 k/uL    Absolute Immature Granulocyte <0.03 0.00 - 0.30 k/uL    WBC Morphology NOT REPORTED     RBC Morphology ANISOCYTOSIS PRESENT     Platelet Estimate NOT REPORTED    C-REACTIVE PROTEIN   Result Value Ref Range    CRP 4.7 0.0 - 5.0 mg/L   Basic Metabolic Panel   Result Value Ref Range    Glucose 86 70 - 99 mg/dL    BUN 18 6 - 20 mg/dL    CREATININE 0.50 0.50 - 0.90 mg/dL    Bun/Cre Ratio NOT REPORTED 9 - 20    Calcium 10.4 8.6 - 10.4 mg/dL    Sodium 141 135 - 144 mmol/L    Potassium 4.9 3.7 - 5.3 mmol/L    Chloride 101 98 - 107 mmol/L    CO2 27 20 - 31 mmol/L    Anion Gap 13 9 - 17 mmol/L    GFR Non-African American >60 >60 mL/min    GFR African American >60 >60 mL/min    GFR Comment          GFR Staging NOT REPORTED          RADIOLOGY:  XR KNEE LEFT (3 VIEWS)   Final Result   Total knee arthroplasty without acute osseous abnormality or significant   joint effusion identified. Osseous changes about the arthroplasty are   favored to be chronic, however comparison with prior imaging is recommended. VL DUP LOWER EXTREMITY VENOUS BILATERAL    (Results Pending)         EKG  None    All EKG's are interpreted by the Emergency Department Physician who either signs or Co-signs this chart in the absence of a cardiologist.    EMERGENCY DEPARTMENT COURSE:  Pt cleared for discharge. U/S unremarkable. X-ray w no acute injuries or soft tissue abnormalities. CRP normal, WBC normal. Pt likely experiencing chronic pain from degenerative joint dz. F/U instructions provided as well as Rx for Voltaren gel with tylenol. PROCEDURES:  None    CONSULTS:  None    CRITICAL CARE:  None    FINAL IMPRESSION      1.  Left knee pain, unspecified chronicity          DISPOSITION / PLAN     DISPOSITION Discharge - Pending Orders Complete 05/08/2020 12:49:27 PM      PATIENT REFERRED TO:  Molly Tuttle MD  [address] [place] [address]  [phone]    Schedule an appointment as soon as possible for a visit in 1 day  For Follow-up      DISCHARGE MEDICATIONS:  New Prescriptions    ACETAMINOPHEN (TYLENOL) 325 MG TABLET    Take 2 tablets by mouth every 6 hours as needed for Pain    DICLOFENAC SODIUM (VOLTAREN) 1 % GEL    Apply 2 g topically 2 times daily       Rj Daniels MD  Emergency Medicine Resident    (Please note that portions of thisnote were completed with a voice recognition program.  Efforts were made to edit the dictations but occasionally words are mis-transcribed.)       Rj Daniels MD  05/08/20 [id number]

## 2020-05-26 ENCOUNTER — OFFICE VISIT (OUTPATIENT)
Dept: ORTHOPEDIC SURGERY | Age: 60
End: 2020-05-26
Payer: COMMERCIAL

## 2020-05-26 PROBLEM — M96.1 POST LAMINECTOMY SYNDROME: Status: ACTIVE | Noted: 2020-05-26

## 2020-05-26 PROCEDURE — 3017F COLORECTAL CA SCREEN DOC REV: CPT | Performed by: ORTHOPAEDIC SURGERY

## 2020-05-26 PROCEDURE — G8417 CALC BMI ABV UP PARAM F/U: HCPCS | Performed by: ORTHOPAEDIC SURGERY

## 2020-05-26 PROCEDURE — 99213 OFFICE O/P EST LOW 20 MIN: CPT | Performed by: ORTHOPAEDIC SURGERY

## 2020-05-26 PROCEDURE — 1036F TOBACCO NON-USER: CPT | Performed by: ORTHOPAEDIC SURGERY

## 2020-05-26 PROCEDURE — G8427 DOCREV CUR MEDS BY ELIG CLIN: HCPCS | Performed by: ORTHOPAEDIC SURGERY

## 2020-05-26 NOTE — PROGRESS NOTES
X-rays, CT scan, and MRI of the lumbar spine are reviewed CT and MRI are from last year x-rays do not show appreciable changes CT scan and MRI reveal history of an L4-5 PLIF no significant ongoing stenosis there is some degenerative disc disease at L2-3 with a very slight retrolisthesis without significant motion on flexion and extension hips largely normal    Assessment:     Patient with post laminectomy syndrome symptoms of low back and neurogenic claudication    Unfortunately patient does not have a significant amount of stenosis and a decompression and/or decompression fusion is not an option     Patient is a candidate for spinal cord stimulator    1. Chronic low back pain, unspecified back pain laterality, unspecified whether sciatica present    2. Lumbar back pain    3. Acquired spondylolisthesis    4. History of lumbar spinal fusion    5. Post laminectomy syndrome        Plan:     Patient want wishes to think about the spinal cord stimulator option    She can call if she would like a referral    Otherwise I should see the patient back in approximately 1 year for routine surveillance of her lumbar spine    No orders of the defined types were placed in this encounter. Collin Roldan MD    Please note that this chart was generated using voicerecognition Dragon dictation software. Although every effort was made to ensurethe accuracy of this automated transcription, some errors in transcription may haveoccurred.

## 2020-08-13 ENCOUNTER — OFFICE VISIT (OUTPATIENT)
Dept: ORTHOPEDIC SURGERY | Age: 60
End: 2020-08-13
Payer: COMMERCIAL

## 2020-08-13 VITALS — HEIGHT: 61 IN | WEIGHT: 174.16 LBS | BODY MASS INDEX: 32.88 KG/M2

## 2020-08-13 PROCEDURE — G8427 DOCREV CUR MEDS BY ELIG CLIN: HCPCS | Performed by: STUDENT IN AN ORGANIZED HEALTH CARE EDUCATION/TRAINING PROGRAM

## 2020-08-13 PROCEDURE — 3017F COLORECTAL CA SCREEN DOC REV: CPT | Performed by: STUDENT IN AN ORGANIZED HEALTH CARE EDUCATION/TRAINING PROGRAM

## 2020-08-13 PROCEDURE — 1036F TOBACCO NON-USER: CPT | Performed by: STUDENT IN AN ORGANIZED HEALTH CARE EDUCATION/TRAINING PROGRAM

## 2020-08-13 PROCEDURE — 99203 OFFICE O/P NEW LOW 30 MIN: CPT | Performed by: STUDENT IN AN ORGANIZED HEALTH CARE EDUCATION/TRAINING PROGRAM

## 2020-08-13 PROCEDURE — G8417 CALC BMI ABV UP PARAM F/U: HCPCS | Performed by: STUDENT IN AN ORGANIZED HEALTH CARE EDUCATION/TRAINING PROGRAM

## 2020-08-14 NOTE — PROGRESS NOTES
[facility]  [facility]  [address] [place]  [address]  Dept: [address] [facility] New Patient Visit      Subjective:   CHIEF COMPLAINT:    Chief Complaint   Patient presents with    Knee Pain     bilateral       HISTORY OF PRESENT ILLNESS:    The patient is a 61 y.o. female who is being seen as a new patient for  Bilateral knee pain L>R. Patient has had multiple surgeries on both knees including TKAs/revision TKAs. Most recently she had a left revision TKA with a constrained prosthesis. She reports that was in 2015. 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 She said since 2015 her knee was doing fairly well, but as time has gone on she's had increased pain, weakness, and feelings of shifting within the knee. She states today as she walks her left knee feels like it's shifting/moving laterally and this also causes pain. She also has limited ROM when compared to the right side. No recent fevers, chills, or other symptoms or feelings of infection. Her surgeries were performed at [facility] by a surgeon who no longer works there. She is seeking answers about her pain/feelings of shifting. She states she is active doing PT probably every 6 months and doing a few sessions each time. She does not think it ever gives her relief or improves her symptoms. She also states she's been falling more lately, which is concerning to her. She has pain radiating up and down her leg from the knee but also reports knee numbness and pain in the left leg radiating to her foot. She has a history of lumbar fusion and is followed by Dr. Kamilla Worthy.     Past Medical History:    Past Medical History:   Diagnosis Date    Asthma     Back pain     Diabetes mellitus ([address]     GERD (gastroesophageal reflux disease)     Glaucoma     Hyperlipidemia     Hypertension     Osteoarthritis        Past Surgical History:    Past Surgical History: Procedure Laterality Date    APPENDECTOMY      BACK SURGERY      HYSTERECTOMY      JOINT REPLACEMENT Bilateral     knee x2 each    LUMBAR FUSION      2015 [facility]     TONSILLECTOMY         Current Medications:   Current Outpatient Medications   Medication Sig Dispense Refill    diclofenac sodium (VOLTAREN) 1 % GEL Apply 2 g topically 2 times daily 1 Tube 3    acetaminophen (TYLENOL) 325 MG tablet Take 2 tablets by mouth every 6 hours as needed for Pain 30 tablet 0    vitamin B-12 (CYANOCOBALAMIN) 1000 MCG tablet       ferrous sulfate 325 (65 Fe) MG EC tablet       fluticasone (FLONASE) 50 MCG/ACT nasal spray fluticasone propionate 50 mcg/actuation nasal spray,suspension      metFORMIN (GLUCOPHAGE) 500 MG tablet       PARoxetine (PAXIL) 10 MG tablet       polyethylene glycol (GAVILAX) powder Gavilax 17 gram/dose oral powder      timolol (TIMOPTIC) 0.25 % ophthalmic solution       traZODone (DESYREL) 50 MG tablet       albuterol (PROVENTIL) (5 MG/ML) 0.5% nebulizer solution Take 2.5 mg by nebulization every 6 hours as needed for Wheezing.  docusate sodium (COLACE) 100 MG capsule Take 100 mg by mouth daily.  losartan-hydrochlorothiazide (HYZAAR) 100-25 MG per tablet Take 1 tablet by mouth daily.  latanoprost (XALATAN) 0.005 % ophthalmic solution 1 drop nightly.  atorvastatin (LIPITOR) 20 MG tablet Take 20 mg by mouth daily.  amLODIPine (NORVASC) 5 MG tablet Take 5 mg by mouth daily.  oxyCODONE-acetaminophen (PERCOCET) 5-325 MG per tablet Take 1 tablet by mouth every 6 hours as needed for Pain.  Budesonide-Formoterol Fumarate (SYMBICORT IN) Inhale  into the lungs daily as needed. No current facility-administered medications for this visit. Allergies:    Ace inhibitors; Adhesive tape;  Chocolate; and Compazine [prochlorperazine maleate]    Social History:   Social History     Socioeconomic History    Marital status:      Spouse name: Not on file    Number of children: Not on file    Years of education: Not on file    Highest education level: Not on file   Occupational History    Not on file   Social Needs    Financial resource strain: Not on file    Food insecurity     Worry: Not on file     Inability: Not on file    Transportation needs     Medical: Not on file     Non-medical: Not on file   Tobacco Use    Smoking status: Former Smoker     Packs/day: 0.25     Years: 10.00     Pack years: 2.50     Types: Cigarettes     Last attempt to quit:      Years since quittin.6    Smokeless tobacco: Never Used   Substance and Sexual Activity    Alcohol use: No    Drug use: Not on file    Sexual activity: Yes     Partners: Male   Lifestyle    Physical activity     Days per week: Not on file     Minutes per session: Not on file    Stress: Not on file   Relationships    Social connections     Talks on phone: Not on file     Gets together: Not on file     Attends Anabaptist service: Not on file     Active member of club or organization: Not on file     Attends meetings of clubs or organizations: Not on file     Relationship status: Not on file    Intimate partner violence     Fear of current or ex partner: Not on file     Emotionally abused: Not on file     Physically abused: Not on file     Forced sexual activity: Not on file   Other Topics Concern    Not on file   Social History Narrative    Not on file       Family History:  No family history on file. REVIEW OF SYSTEMS:  Neuro: no numbness, tingling, or weakness  Msk: bilateral knee pain L>R with shifting and weakness    Objective :   PHYSICAL EXAM:  Ht 5' 1\" (1.549 m)   Wt 174 lb 2.6 oz (79 kg)   BMI 32.91 kg/m² Body mass index is 32.91 kg/m².   Physical Exam  Gen: Alert and oriented, no acute distress, resting comfortably in the clinic  Psych: Patient has good fund of knowledge and displays understanging of exam, diagnosis, and plan  Neuro: Alert, oriented  Head: Normocephalic atraumatic Eyes: Extra-ocular muscles intact  Chest: Symmetric chest excursion, respirations unlabored and regular  Skin: Warm, well perfused  RLE: Surgical incision well healed, no ttp. ROM 0-130 degrees. Stable to varus/valgus. No crepitance, shifting or instability. Gross motor/sensation intact. Good quad tone. Foot warm and well perfused. LLE: Surgical incision well healed. TTP present along medial joint line. Quads with decreased tone compared to contralateral side. Extension lag of about 5 degrees with flexion to 90. Stable to varus/valgus; however a clunk is felt during motion that patient reports has been there a while and does not feel normal to her. No instability in flexion or extension appreciated. Gross motor/sensation intact with warm and well perfused foot today. Radiology:   History: Right knee pain, history of revision TKA    Comparison: 2/23/2006    Findings: 3 views of the right knee showing stable long-stem femoral and tibial total knee arthroplasty implants. These appear well-fixed without any evidence of loosening or failure currently. No osteolysis seen around implant-bone interface. Joint line appears well-centered and at the appropriate height relative to medial epicondyle as fibular head. No lesions seen. Impression: Stable right revision TKA hardware      History: Left knee pain, history of revision TKA and PJI    Comparison: 5/8/2020      Findings: 3 views of the left knee showing stable appearing implants with scattered metallic/radiopaque debris throughout the knee. Long stem implants appear stable without evidence of loosening. Some osteolysis present around implant bone interface near the joint/condyles and plateau. Very thin resurfaced patella without fracture. Significant post-surgical bone loss with implanted augments seen.     Impression: Mostly stable appearing left revision TKA hardware    Assessment:   61y.o. year old female with bilateral revision TKA, painful left knee    Plan:      Long discussion held with patient regarding her bilateral revision TKAs and pain. We made a very thorough attempt at elucidating the specific pain and symptoms the patient is experiencing currently. We do feel like the implants appear mostly stable on both sides and that the osteolysis or bone loss seen on the left side is stable and not changing at this time when comparing new images to prior films. Do not think there is gross instability causing her pain currently. The shifting she feels may either be due to the implants themselves and the mechanical motion of them, or possibly due to quadriceps weakness. Rx provided for formal PT for ROM, strengthening and gait training. We also would like her to try the anti-gravity treadmill ([employer]k-[employer]) to see if that would help her improve her quad strength and balance. We also discussed that any time there is a patient with a painful total joint arthroplasty we must rule out infection as the source. Especially considering her history of prior PJI on the left side we will order initial labs to evaluate for signs of inflammation or infection and proceed accordingly once those labs are complete. We discussed that the radiating pain/numbness in her left leg is likely due to her spine, although the radiating pain could potentially just be referred pain and deconditioning. We discussed she should return to see Dr. Golden Mendiola or we could give her a referral for another spine surgeon as a second opinion. At this time she'd just like to think about and consider her options before going to see another physician. Also provided rx for cane and walker as she no longer has either at home, but feels they would be beneficial for her as she has been falling more lately. Follow up in 4-6 weeks after PT to re-evaluate her progress. Return in about 6 weeks (around 9/24/2020). No orders of the defined types were placed in this encounter.       Orders Placed This Encounter   Procedures    CBC with Differential    C-Reactive Protein     Standing Status:   Future     Standing Expiration Date:   8/13/2021    Sedimentation Rate     Standing Status:   Future     Standing Expiration Date:   8/13/2021   [address] [facility] Physical Therapy [facility]     Referral Priority:   Routine     Referral Type:   Eval and Treat     Referral Reason:   Specialty Services Required     Requested Specialty:   Physical Therapy     Number of Visits Requested:   1    [place]        Electronically signed by Geoffery Aase, DO on8/14/2020 at 2:11 PM

## 2020-08-18 ENCOUNTER — HOSPITAL ENCOUNTER (OUTPATIENT)
Dept: PHYSICAL THERAPY | Facility: CLINIC | Age: 60
Setting detail: THERAPIES SERIES
Discharge: HOME | End: 2020-08-18
Payer: COMMERCIAL

## 2020-08-18 PROCEDURE — 97161 PT EVAL LOW COMPLEX 20 MIN: CPT

## 2020-08-18 PROCEDURE — 97116 GAIT TRAINING THERAPY: CPT

## 2020-08-18 NOTE — CONSULTS
[] [address]  [address]  [phone]  F: [phone] [x] [address]  [facility] [place] [facility]\"" 36   [address]  P: [phone]  F: [phone] [] [place]  [address] [facility]  P: [phone]  F: [phone] [] [address]  [facility]   [place]: [phone]  F: [phone]      Physical Therapy Lower Extremity Evaluation    Date:  2020  Patient: Gerhardt Heaps  : 1960  MRN: [id number]  Physician: Ousmane Galvan D.O. Insurance:[facility] Medicaid  Medical Diagnosis: s/p Left knee TKA revision with weakness and instability  Rehab Codes: M25.562, M25.662, M79.605, M79.652, M79.662  Onset date:   Next 's appt.: 10/08/2020    Subjective:    CC/HPI: (onset date) Pt is a 61 y.o. female with a history of multiple knee surgeries; most recently she had a Left TKA  revision in  secondary to an infection. Her cc is Left knee pain and weakness. Current pain rating 6/10; she ambulates with a straight cane. Aggravating factors include walking and standing.      PMHx: [] Unremarkable [x] Diabetes [x] HTN  [] Pacemaker   [] MI/Heart Problems [] Cancer [] Arthritis [x] Other:Lumbar fusion               [x] Refer to full medical chart  In EPIC       Comorbidities:   [] Obesity [] Dialysis  [] Other:   [] Asthma/COPD [] Dementia [] Other:   [] Stroke [] Sleep apnea [] Other:   [] Vascular disease [] Rheumatic disease [] Other:     Tests: [] X-Ray: [] MRI:  [] Other:    Medications: [] Refer to full medical record [] None [] Other:  Allergies:      [] Refer to full medical record [] None [] Other:    Function:  Hand Dominance  [] Right  [] Left  Patient lives with: alone   In what type of home [x]  One story   [] Two story   [] Split level   Number of stairs to enter 0   With handrail on the []  Right to enter   [] Left to enter   Bathroom has a []  Tub only  [x] Tub/shower combo   [] Walk in shower    []  Grab bars   Washing machine is on []  Main level   [] Second level   [] Basement   Employer    Job Status []  Normal duty   [] Light duty   [] Off due to condition    []  Retired   [] Not employed   [x] Disability  [] Other:  []  Return to work: Work activities/duties        Gait Prior level of function Current level of function    [] Independent  [] Assist [] Independent  [] Assist   Device: [] Independent [] Independent    [x] Straight Cane-intermittent [] Quad cane [x] Straight Cane [] Quad cane    [] Standard walker [] Rolling walker   [] 4 wheeled walker [] Standard walker [] Rolling walker   [] 4 wheeled walker    [] Wheelchair [] Wheelchair        pain  yes   location Left knee-anterior   current: 0-10  6/10   pain at worst  7/10   pain type  shooting, burning   what makes pain worse     what makes pain better     better/worse/same  worse   disturbed sleep?  yes   ;    Objective:    ROM  ° A/P STRENGTH TESTS (+/-) Left Right Not Tested    Left Right Left Right Ant.  Drawer   []   Hip Flex 35°  2- 4+ Post. Drawer   []   Ext     Lachmans   []   ER     Valgus Stress   []   IR     Varus Stress   []   ABD 20°  4-  Bryans   []   ADD     Apleys Comp.   []   Knee Flex 60(63) 125 4- 5 Apleys Dist.   []   Ext -15 -7 3+ 4+ Hip Scouring   []   Ankle DF     BERTHAs   []   PF     Piriformis   []   INV     Eris   []   EVER     Talor Tilt   []        Pat-Fem Grind   []       OBSERVATION No Deficit Deficit Not Tested Comments   Posture       Forward Head [] [] []    Rounded Shoulders [] [] []    Kyphosis [] [] []    Lordosis [] [] []    Lateral Shift [] [] []    Scoliosis [] [] []    Iliac Crest [] [] []    PSIS [] [] []    ASIS [] [] []    Genu Valgus [] [x] []    Genu Varus [] [] []    Genu Recurvatum [] [] []    Pronation [] [] []    Supination [] [] []    Leg Length Discrp [] [] []    Slumped Sitting [] [] []    Palpation [] [x] [] Distal quads; medial knee   Sensation [] [] []    Edema [] [] []    Neurological [] [] []    Patellar Mobility [] [x] [] Poor M/L   Patellar Orientation [] [] []    Gait [] [x] [] Analysis:antalgic gait-uses  straight cane         FUNCTION Normal Difficult Unable   Sitting [] [] []   Standing [] [] []   Ambulation [] [] []   Groom/Dress [] [] []   Lift/Carry [] [] []   Stairs [] [] []   Bending [] [] []   Squat [] [] []   Kneel [] [] []     BALANCE/PROPRIOCEPTION              [x] Not tested   Single leg stance       R                     L                                PAIN   Eyes open                             Sec. Sec                  . []    Eyes closed                          Sec. Sec                  . []        FUNCTIONAL TESTS PAIN NO PAIN COMMENTS   Step Test 4 [] []    6 [] []    8 [] []    Squat [] []      Functional Test: LEFI Score: 70% perceived impairment  24/80     Comments: Pt leans to R during gait to limit weight bearing on L LE; she has very limited knee flexion during initial and mid swing;     Assessment:   Pt would  benefit from skilled PT interventions to decrease pain, increase strength, ROM, and overall functional mobility for improved quality of life. Problems:    [x] ? Pain: 6/10 L knee  [x] ? ROM: L knee flexion limited to 60°  [x] ? Strength: Left quads 3+/5  [x] ? Function:  Poor tolerance to walking, standing; unable to negotiate stairs due to limited knee flex; poor endurance     STG: (to be met in 8 treatments)  1. ? Pain: reduce Left knee pain to 4/10 during gait and ADL's  2. ? ROM: improve Left knee flex to 75° to assist with normalizing gait   3. ? Strength: Left quads to 4+/5  4. ? Function:improve LEFi score for walking 2 blocks to little difficutly  5.  Patient to be independent with home exercise program as demonstrated by performance with correct form without Instructions for next treatment: Alter-G; Ex to improve Left knee flex; endurance activities; complete TUG test      Evaluation Complexity:  History (Personal factors, comorbidities) [] 0 [x] 1-2 [] 3+   Exam (limitations, restrictions) [x] 1-2 [] 3 [] 4+   Clinical presentation (progression) [x] Stable [] Evolving  [] Unstable   Decision Making [x] Low [] Moderate [] High    [x] Low Complexity [] Moderate Complexity [] High Complexity       Treatment Charges: Mins Units   [x] Evaluation       [x]  Low       []  Moderate       []  High 45 1   []  Modalities     []  Ther Exercise     []  Manual Therapy     []  Ther Activities     []  Aquatics     []  Vasocompression     [x]  Other: gait 8 1     TOTAL TREATMENT TIME: 53 min    Time in: 9:07a   Time Out: 10:05a    Electronically signed by: Chaparro Newby PT        Physician Signature:________________________________Date:__________________  By signing above or cosigning this note, I have reviewed this plan of care and certify a need for medically necessary rehabilitation services.      *PLEASE SIGN ABOVE AND FAX BACK ALL PAGES*

## 2020-08-24 ENCOUNTER — HOSPITAL ENCOUNTER (OUTPATIENT)
Dept: PHYSICAL THERAPY | Facility: CLINIC | Age: 60
Setting detail: THERAPIES SERIES
Discharge: HOME | End: 2020-08-24
Payer: COMMERCIAL

## 2020-08-24 PROCEDURE — 97110 THERAPEUTIC EXERCISES: CPT

## 2020-08-24 PROCEDURE — 97116 GAIT TRAINING THERAPY: CPT

## 2020-08-24 NOTE — FLOWSHEET NOTE
[] [address]  [address]  [phone]  F: [phone] [x] [address]  [facility] 36   [address]  P: [phone]  F: [phone] [] [address]  [address] [facility]  P: [phone]  F: [phone] [] [address]  [facility]   [address]   [place]: [phone]  F: [phone]      Physical Therapy Daily Treatment Note    Date:  2020  Patient Name:  La Nena Jose    :  1960  MRN: [id number]  Physician: William Caballero D.O. Insurance:[facility] Medicaid  Medical Diagnosis: s/p Left knee TKA revision with weakness and instability                   Rehab Codes: M25.562, M25.662, M79.605, M79.652, M79.662  Onset date:                    Next 's appt.: 10/08/2020  Visit# / total visits:      Cancels/No Shows: 0    Subjective:    Pain:  [x] Yes  [] No Location: Left knee Pain Rating: (0-10 scale) 4/10  Pain altered Tx:  [] No  [] Yes  Action:  Comments: Pt rates Left knee pain 4/10; states she had a friend move in with her and she has been helping with some knee exercises.      Objective:  Modalities:   Precautions:  Exercises:  Exercise Reps/ Time Weight/ Level Comments         Nu Step 6min L1                    Alter-G- 15min   60-65% WB; large shorts          with intervals of rest while in Alter-G   Heel raises 2x10                   mat      Heel slides 2x10     QS 2x10     SAQ 2x10     iso hip ADD 2x10           sitting      LAQ 2x10     March in place 2x10ea                       standing                            Other:     Specific Instructions for next treatment: Alter-G; Ex to improve Left knee flex; endurance activities; complete TUG test      Treatment Charges: Mins Units   []  Modalities     [x]  Ther Exercise 25 2   [] Manual Therapy     []  Ther Activities     []  Aquatics     []  Vasocompression     [x]  Other: gait  15 1   Total Treatment time 40 3       Assessment: [x] Progressing toward goals. AROM improved to 76° flex after heel slides; issued written HEP for days she does not attend PT    [x] No change. [] Other:  [x]  Pt would continue to benefit from skilled PT interventions to decrease pain, increase strength, ROM, and overall functional mobility for improved quality of life. STG/LTG     STG: (to be met in 8 treatments)  1. ? Pain: reduce Left knee pain to 4/10 during gait and ADL's  2. ? ROM: improve Left knee flex to 75° to assist with normalizing gait   3. ? Strength: Left quads to 4+/5  4. ? Function:improve LEFi score for walking 2 blocks to little difficutly  5. Patient to be independent with home exercise program as demonstrated by performance with correct form without cues. 6. Demonstrate Knowledge of fall prevention  LTG: (to be met in 16 treatments)  1. Improve getting in/out of car to little difficutly  2. Reduce Left knee pain to 2/10  3. Able to ambulate 2 blocks with no difficulty       Pt. Education:  [] Yes  [] No  [] Reviewed Prior HEP/Ed  Method of Education: [] Verbal  [] Demo  [x] Written  Comprehension of Education:  [x] Verbalizes understanding. [x] Demonstrates understanding. [] Needs review. [] Demonstrates/verbalizes HEP/Ed previously given. Plan: [x] Continue current frequency toward long and short term goals.     [] Specific Instructions for subsequent treatments:       Time In: 9a            Time Out: 9:50a    Electronically signed by:  Radu Coombs PT

## 2020-08-28 ENCOUNTER — HOSPITAL ENCOUNTER (OUTPATIENT)
Dept: PHYSICAL THERAPY | Facility: CLINIC | Age: 60
Setting detail: THERAPIES SERIES
Discharge: HOME | End: 2020-08-28
Payer: COMMERCIAL

## 2020-08-28 PROCEDURE — 97116 GAIT TRAINING THERAPY: CPT

## 2020-08-28 PROCEDURE — 97110 THERAPEUTIC EXERCISES: CPT

## 2020-08-28 NOTE — FLOWSHEET NOTE
[] [address]  [address]  [phone]  F: [phone] [x] [address]  [facility]   [address]  P: [phone]  F: [phone] [] [place]  [address] [facility]  P: [phone]  F: [phone] [] [address]  [facility]   [address]   [place]: [phone]  F: [phone]      Physical Therapy Daily Treatment Note    Date:  2020  Patient Name:  Rosy Marcos    :  1960  MRN: [id number]  Physician: Alexa Navas D.O. Insurance:[facility] Medicaid  Medical Diagnosis: s/p Left knee TKA revision with weakness and instability                   Rehab Codes: M25.562, M25.662, M79.605, M79.652, M79.662  Onset date:                    Next Dr's appt.: 10/08/2020  Visit# / total visits: 3/16     Cancels/No Shows: 0    Subjective:    Pain:  [x] Yes  [] No Location: Left knee Pain Rating: (0-10 scale) 5/10  Pain altered Tx:  [x] No  [] Yes  Action:    Comments: pt reports no new complaints at this time.       Objective:  Modalities:   Precautions:  Exercises:  Exercise Reps/ Time Weight/ Level Comments         Nu Step 6min L1                    Alter-G- 15min  1.4 MPH 60-65% WB; large shorts          with intervals of rest while in Alter-G   Heel raises 2x10                   mat      Heel slides 2x10  With manual overpressure   QS 2x10     SAQ 2x10     iso hip ADD 2x10           sitting      LAQ 2x10     March in place 2x10ea                       standing                            Other:     Specific Instructions for next treatment: Alter-G; Ex to improve Left knee flex; endurance activities; complete TUG test      Treatment Charges: Mins Units   []  Modalities     [x]  Ther Exercise 31 2   []  Manual Therapy     []  Ther Activities []  Aquatics     []  Vasocompression     [x]  Other: gait  15 1   Total Treatment time 46 3       Assessment: [x] Progressing toward goals. While on AlterG pt takes one rest break at 8'30\". Following exercises pt describes feeling stiff, but states that once she leaves she feel typically feels improved. [] No change. [] Other:  [x]  Pt would continue to benefit from skilled PT interventions to decrease pain, increase strength, ROM, and overall functional mobility for improved quality of life.           STG: (to be met in 8 treatments)  1. ? Pain: reduce Left knee pain to 4/10 during gait and ADL's  2. ? ROM: improve Left knee flex to 75° to assist with normalizing gait   3. ? Strength: Left quads to 4+/5  4. ? Function:improve LEFi score for walking 2 blocks to little difficutly  5. Patient to be independent with home exercise program as demonstrated by performance with correct form without cues. 6. Demonstrate Knowledge of fall prevention  LTG: (to be met in 16 treatments)  1. Improve getting in/out of car to little difficutly  2. Reduce Left knee pain to 2/10  3. Able to ambulate 2 blocks with no difficulty       Pt. Education:  [x] Yes  [] No  [x] Reviewed Prior HEP/Ed  Method of Education: [] Verbal  [] Demo  [] Written  Comprehension of Education:  [] Verbalizes understanding. [] Demonstrates understanding. [] Needs review. [x] Demonstrates/verbalizes HEP/Ed previously given. Plan: [x] Continue current frequency toward long and short term goals.     [] Specific Instructions for subsequent treatments:       Time In: 9:30am             Time Out: 10:22am    Electronically signed by:  Onel Jefferson PTA

## 2020-08-31 ENCOUNTER — HOSPITAL ENCOUNTER (OUTPATIENT)
Dept: PHYSICAL THERAPY | Facility: CLINIC | Age: 60
Setting detail: THERAPIES SERIES
Discharge: HOME | End: 2020-08-31
Payer: COMMERCIAL

## 2020-08-31 PROCEDURE — 97110 THERAPEUTIC EXERCISES: CPT

## 2020-08-31 PROCEDURE — 97116 GAIT TRAINING THERAPY: CPT

## 2020-08-31 NOTE — FLOWSHEET NOTE
[] [address]  [address]  [phone]  F: [phone] [x] [address]  [facility]   [address]  P: [phone]  F: [phone] [] [place]  [address] [facility]  P: [phone]  F: [phone] [] [address]  [facility]   [address]   [place]: [phone]  F: [phone]      Physical Therapy Daily Treatment Note    Date:  2020  Patient Name:  George Barcenas    :  1960  MRN: [id number]  Physician: Marcela Sharp D.O.                      [facility]  Medical Diagnosis: s/p Left knee TKA revision with weakness and instability                   Rehab Codes: M25.562, M25.662, M79.605, M79.652, M79.662  Onset date:                    Next Dr's appt.: 10/08/2020  Visit# / total visits:      Cancels/No Shows: 0    Subjective:    Pain:  [x] Yes  [] No Location: Left knee Pain Rating: (0-10 scale) 6.5/10  Pain altered Tx:  [x] No  [] Yes  Action:    Comments: Pt rates Left knee pain 6.5/10     Objective:  Modalities: none  Precautions:  Exercises:  Exercise Reps/ Time Weight/ Level Comments         Nu Step 7min L2                    Alter-G- 15min  1.2- 1.4 MPH 60% WB; large shorts          with intervals of rest while in Alter-G   Heel raises 2x10       March in place 2x10           mat      Heel slides 2x10  With manual overpressure   QS 2x10     SAQ 2x10     iso hip ADD 2x10           sitting      LAQ 2x10ea     March in place 2x10ea                       standing                            Other:     Specific Instructions for next treatment: Alter-G; Ex to improve Left knee flex; endurance activities;     Treatment Charges: Mins Units   []  Modalities     [x]  Ther Exercise 32 2   []  Manual Therapy     []  Ther Activities     []

## 2020-09-03 ENCOUNTER — HOSPITAL ENCOUNTER (OUTPATIENT)
Dept: PHYSICAL THERAPY | Facility: CLINIC | Age: 60
Setting detail: THERAPIES SERIES
Discharge: HOME | End: 2020-09-03
Payer: COMMERCIAL

## 2020-09-03 PROCEDURE — 97110 THERAPEUTIC EXERCISES: CPT

## 2020-09-03 NOTE — FLOWSHEET NOTE
[] [address]  [address]  [phone]  F: [phone] [x] [address]  [facility] 36   [address]  P: [phone]  F: [phone] [] [place]  [address] [facility]  P: [phone]  F: [phone] [] [address]  [facility]   [address]   [place]: [phone]  F: [phone]      Physical Therapy Daily Treatment Note    Date:  9/3/2020  Patient Name:  Sayra Woodward    :  1960  MRN: [id number]  Physician: Bello Salguero D.O.                      Insurance:[employer]  Medical Diagnosis: s/p Left knee TKA revision with weakness and instability                   Rehab Codes: M25.562, M25.662, M79.605, M79.652, M79.662  Onset date:                    Next Dr's appt.: 10/08/2020  Visit# / total visits:      Cancels/No Shows: 0    Subjective:    Pain:  [x] Yes  [] No Location: Left knee Pain Rating: (0-10 scale)   4-5/10  Pain altered Tx:  [x] No  [] Yes  Action:    Comments: Pt rates Left knee pain 4-5/10     Objective:  Modalities: none  Precautions:   Exercises:  Exercise Reps/ Time Weight/ Level Comments         Nu Step 6min L2                    Alter-G- 5:44min  1.2-  MPH 60% WB; large shorts          with intervals of rest while in Alter-G   Heel raises 2x10       March in place 2x10           mat      Heel slides 2x10  With manual overpressure for flexion   QS 2x10     SAQ 2x10     iso hip ADD 2x10     Bent knee hip ABD 2x10 Lime   added 9/3   bridging x10  added 9/3   (will D/C-see below)               sitting      LAQ 2x10ea Lime x10    March in place x10ea                       standing      Step ups x10 4\" Lead w L                                     Other:     Specific Instructions for next treatment: Alter-G; Ex to improve Left knee flex; endurance activities;     Treatment Charges: Mins Units   [x]  Modalities: HP 14 0   [x]  Ther Exercise 38 3   []  Manual Therapy     []  Ther Activities     []  Aquatics     []  Vasocompression     [x]  Other: gait  6 0   Total Treatment time 44 3       Assessment: [x] Progressing toward goals. 9/3: Left knee flex ? to 100°; Pt fatigued with addition of hip Ex's (bridging and hip ABD w tband) and asks to space them out next time; Had to hold Alter G less than 6 min in d/t c/o increased LBP (first 2:44min of Alter-G were spent completing heel raises so minimal walking today 2° to LBP); Applied HP in Left side lying to LB d/t increased LBP. Will D/C bridging Ex    8/31: TUG test 19 sec    [] No change. [] Other:  [x]  Pt would continue to benefit from skilled PT interventions to decrease pain, increase strength, ROM, and overall functional mobility for improved quality of life.           STG: (to be met in 8 treatments)  1. ? Pain: reduce Left knee pain to 4/10 during gait and ADL's  2. ? ROM: improve Left knee flex to 75° to assist with normalizing gait   3. ? Strength: Left quads to 4+/5  4. ? Function:improve LEFi score for walking 2 blocks to little difficutly  5. Patient to be independent with home exercise program as demonstrated by performance with correct form without cues. 6. Demonstrate Knowledge of fall prevention  LTG: (to be met in 16 treatments)  1. Improve getting in/out of car to little difficutly  2. Reduce Left knee pain to 2/10  3. Able to ambulate 2 blocks with no difficulty       Pt. Education:  [x] Yes  [] No  [x] Reviewed Prior HEP/Ed  Method of Education: [] Verbal  [] Demo  [] Written  Comprehension of Education:  [] Verbalizes understanding. [] Demonstrates understanding. [] Needs review. [x] Demonstrates/verbalizes HEP/Ed previously given. Plan: [x] Continue current frequency toward long and short term goals.     [] Specific Instructions for subsequent treatments:       Time In: 7:00am             Time Out: 8:05am    Electronically signed by:  Pierce Celis, PT

## 2020-09-09 ENCOUNTER — HOSPITAL ENCOUNTER (OUTPATIENT)
Dept: PHYSICAL THERAPY | Facility: CLINIC | Age: 60
Setting detail: THERAPIES SERIES
Discharge: HOME | End: 2020-09-09
Payer: COMMERCIAL

## 2020-09-09 PROCEDURE — 97110 THERAPEUTIC EXERCISES: CPT

## 2020-09-09 PROCEDURE — 97116 GAIT TRAINING THERAPY: CPT

## 2020-09-09 NOTE — FLOWSHEET NOTE
[] [address]  [address]  [phone]  F: [phone] [x] [address]  [facility]   [address]  P: [phone]  F: [phone] [] [place]  1500 [facility]  P: [phone]  F: [phone] [] [address]  [facility]   [place]: [phone]  F: [phone]      Physical Therapy Daily Treatment Note    Date:  2020  Patient Name:  Zoey Gama    :  1960  MRN: [id number]  Physician: Librado Núñez D.O. Insurance:[facility]  Medical Diagnosis: s/p Left knee TKA revision with weakness and instability                   Rehab Codes: M25.562, M25.662, M79.605, M79.652, M79.662  Onset date:                    Next 's appt.: 10/08/2020  Visit# / total visits:      Cancels/No Shows: 0    Subjective:    Pain:  [x] Yes  [] No Location: Left knee Pain Rating: (0-10 scale)   4-5/10  Pain altered Tx:  [x] No  [] Yes  Action:    Comments: Pt rates Left knee pain  4.5/10; Pt states it took 2 days for her back to calm down from last session.       Objective:   Modalities: none  Precautions:   Exercises:  Exercise Reps/ Time Weight/ Level Comments         Nu Step 6min L2                    Alter-G- 12min  1.6-1.8 MPH 60% WB; large shorts          with intervals of rest while in Alter-G   Heel raises 2x10       March in place 2x10           mat      Heel slides 2x10  With light  manual overpressure for flexion   QS 2x10     SAQ 2x10     iso hip ADD 2x10     Bent knee hip ABD 2x10 Lime   added /3-HOLD -                     sitting      LAQ x10ea Lime x10    March in place 2x10ea                       standing      Step ups x10 4\" Lead w L                                     Other:     Specific Instructions for next treatment: Alter-G; Ex to improve Left knee flex; endurance activities;     Treatment Charges: Mins Units   -  Modalities:      [x]  Ther Exercise 35 2   []  Manual Therapy     []  Ther Activities     []  Aquatics     []  Vasocompression     [x]  Other: gait  12 1   Total Treatment time 47 3        Assessment: [x] Progressing toward goals. 2 short rest breaks while on Alter-G; No increase in knee pain while at 60% weight bearing- Held bridging and hook lying hip ABD with tband as these Ex's aggravated LBP last session    9/3: Left knee flex ? to 100°;   8/31: TUG test 19 sec    [] No change. [] Other:  [x]  Pt would continue to benefit from skilled PT interventions to decrease pain, increase strength, ROM, and overall functional mobility for improved quality of life.           STG: (to be met in 8 treatments)  1. ? Pain: reduce Left knee pain to 4/10 during gait and ADL's  2. ? ROM: improve Left knee flex to 75° to assist with normalizing gait   3. ? Strength: Left quads to 4+/5  4. ? Function:improve LEFi score for walking 2 blocks to little difficutly  5. Patient to be independent with home exercise program as demonstrated by performance with correct form without cues. 6. Demonstrate Knowledge of fall prevention  LTG: (to be met in 16 treatments)  1. Improve getting in/out of car to little difficutly  2. Reduce Left knee pain to 2/10  3. Able to ambulate 2 blocks with no difficulty       Pt. Education:  [x] Yes  [] No  [x] Reviewed Prior HEP/Ed  Method of Education: [] Verbal  [] Demo  [] Written  Comprehension of Education:  [] Verbalizes understanding. [] Demonstrates understanding. [] Needs review. [x] Demonstrates/verbalizes HEP/Ed previously given. Plan: [x] Continue current frequency toward long and short term goals.     [] Specific Instructions for subsequent treatments:       Time In: 8:00am             Time Out: 8:50am    Electronically signed by:  Harman White, PT

## 2020-09-11 ENCOUNTER — HOSPITAL ENCOUNTER (OUTPATIENT)
Dept: PHYSICAL THERAPY | Facility: CLINIC | Age: 60
Setting detail: THERAPIES SERIES
Discharge: HOME | End: 2020-09-11
Payer: COMMERCIAL

## 2020-09-11 PROCEDURE — 97116 GAIT TRAINING THERAPY: CPT

## 2020-09-11 PROCEDURE — 97110 THERAPEUTIC EXERCISES: CPT

## 2020-09-11 NOTE — FLOWSHEET NOTE
[] [address]  [address]  [phone]  F: [phone] [x] [address]  [facility] 36   [address]  P: [phone]  F: [phone] [] [place]  [address] [facility]  P: [phone]  F: [phone] [] [address]  [facility]   [address]   [place]: [phone]  F: [phone]      Physical Therapy Daily Treatment Note    Date:  2020  Patient Name:  Pascual Mark    :  1960  MRN: [id number]  Physician: Sunita Redmond D.O. 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 States she is still resistant to resume supine hip abduction and bridges d/t the increased LBP it caused her. Pt is currently without LBP.       Objective:   Modalities: none  Precautions:   Exercises:  Exercise Reps/ Time Weight/ Level Comments         Nu Step 5min L2                    Alter-G- 12min  1.6-1.8 MPH 60% WB; large shorts          with intervals of rest while in Alter-G   Heel raises 2x10       March in place 2x10           mat      Heel slides 2x10  With light  manual overpressure for flexion   QS 2x10     SAQ 2x10     iso hip ADD 2x10     Bent knee hip ABD 2x10 Lime   added 9/3-HOLD d/t aggravated LBP                     sitting      LAQ 2x10ea     March in place 2x10ea                       standing      Step ups x10 4\" Lead w L                                     Other:     Specific Instructions for next treatment: Alter-G; Ex to improve Left knee flex; endurance activities;     Treatment Charges: Mins Units   -  Modalities:      [x]  Ther Exercise 33 2   []  Manual Therapy     []  Ther Activities     []  Aquatics     []  Vasocompression     [x]  Other: gait  12 1   Total Treatment time 45 3        Assessment: [x] Progressing toward goals. Pt again takes 2 rest breaks while on alterG. Completes her standing exercises within her rest breaks. Good tolerance to exercises, continued to hold bridging and resisted hip abduction d/t pt fearful of LBP flare up    [] No change. [] Other:  [x]  Pt would continue to benefit from skilled PT interventions to decrease pain, increase strength, ROM, and overall functional mobility for improved quality of life.           STG: (to be met in 8 treatments)  1. ? Pain: reduce Left knee pain to 4/10 during gait and ADL's  2. ? ROM: improve Left knee flex to 75° to assist with normalizing gait   3. ? Strength: Left quads to 4+/5  4. ? Function:improve LEFi score for walking 2 blocks to little difficutly  5. Patient to be independent with home exercise program as demonstrated by performance with correct form without cues. 6. Demonstrate Knowledge of fall prevention  LTG: (to be met in 16 treatments)  1. Improve getting in/out of car to little difficutly  2. Reduce Left knee pain to 2/10  3. Able to ambulate 2 blocks with no difficulty       Pt. Education:  [] Yes  [x] No  [] Reviewed Prior HEP/Ed  Method of Education: [] Verbal  [] Demo  [] Written  Comprehension of Education:  [] Verbalizes understanding. [] Demonstrates understanding. [] Needs review. [] Demonstrates/verbalizes HEP/Ed previously given. Plan: [x] Continue current frequency toward long and short term goals.     [] Specific Instructions for subsequent treatments:       Time In: 9:33am             Time Out: 10:23am    Electronically signed by:  Onel Jefferson PTA

## 2020-09-14 ENCOUNTER — HOSPITAL ENCOUNTER (OUTPATIENT)
Dept: PHYSICAL THERAPY | Facility: CLINIC | Age: 60
Setting detail: THERAPIES SERIES
Discharge: HOME | End: 2020-09-14
Payer: COMMERCIAL

## 2020-09-14 NOTE — FLOWSHEET NOTE
[] [address]  [address]    [phone]  F: [phone]   [x] [address] [facility] 36   [address]  P: [phone]  F: [phone]  [] [place]  [address] [facility]  P: [phone]  F: [phone]  [] [address] [facility]   [address]   [place]: [phone]  F: [phone]   [] [address]  [place]: [phone]   F: [phone]     Physical Therapy Cancel/No Show note    Date: 2020  Patient: Ness Choe  : 1960  MRN: [id number]    Cancels/No Shows to date:     For today's appointment patient:    [x]  Cancelled    [] Rescheduled appointment    [] No-show     Reason given by patient:    [x]  Patient ill    []  Conflicting appointment    [] No transportation      [] Conflict with work    [] No reason given    [] Weather related    [] [id number]    [] Other:      Comments:        [] Next appointment was confirmed    Electronically signed by: Nan Hawkins PT

## 2020-09-16 ENCOUNTER — HOSPITAL ENCOUNTER (OUTPATIENT)
Dept: PHYSICAL THERAPY | Facility: CLINIC | Age: 60
Setting detail: THERAPIES SERIES
Discharge: HOME | End: 2020-09-16
Payer: COMMERCIAL

## 2020-09-16 PROCEDURE — 97110 THERAPEUTIC EXERCISES: CPT

## 2020-09-16 PROCEDURE — 97116 GAIT TRAINING THERAPY: CPT

## 2020-09-16 NOTE — FLOWSHEET NOTE
[] [address]  [address]  [phone]  F: [phone] [x] [address]  [facility]   [address]  P: [phone]  F: [phone] [] [place]  [address] [facility]  P: [phone]  F: [phone] [] [address]  [facility]   [address]   [place]: [phone]  F: [phone]      Physical Therapy Daily Treatment Note    Date:  2020  Patient Name:  Willow Ovalle    :  1960  MRN: [id number]  Physician: Dino Blount D.O.                      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                   Rehab Codes: M25.562, M25.662, M79.605, M79.652, M79.662  Onset date:                    Next Dr's appt.: 10/08/2020  Visit# / total visits:      Cancels/No Shows: 1/0    Subjective:    Pain:  [x] Yes  [] No Location: Left knee Pain Rating: (0-10 scale)  2-3/10  Pain altered Tx:  [x] No  [] Yes  Action:    Comments: Pt notes mild knee pain; feels it is due to weather change    Objective:   Modalities: none  Precautions:   Exercises:  Exercise Reps/ Time Weight/ Level Comments         Nu Step 6min L2                    Alter-G- 12min  1.5-1.8 MPH 60% WB; large shorts          with intervals of rest while in Alter-G   Heel raises 2x10       March in place 2x10           mat      Heel slides 2x10  With light  manual overpressure for flexion   QS 2x10     SAQ 2x10     iso hip ADD 2x10     Bent knee hip ABD 2x10 Lime   added 9/3-HOLD d/t aggravated LBP                     sitting      LAQ 2x10ea     March in place 2x10ea                       standing      Step ups x10 4\" Lead w L                                     Other:     Specific Instructions for next treatment: Alter-G; Ex to improve

## 2020-09-22 ENCOUNTER — HOSPITAL ENCOUNTER (OUTPATIENT)
Dept: PHYSICAL THERAPY | Facility: CLINIC | Age: 60
Setting detail: THERAPIES SERIES
Discharge: HOME | End: 2020-09-22
Payer: COMMERCIAL

## 2020-09-22 PROCEDURE — 97110 THERAPEUTIC EXERCISES: CPT

## 2020-09-22 PROCEDURE — 97116 GAIT TRAINING THERAPY: CPT

## 2020-09-22 NOTE — FLOWSHEET NOTE
[] [address]  [address]  [phone]  F: [phone] [x] [address]  [facility] 36   [address]  P: [phone]  F: [phone] [] [address]  [address] [facility]  P: [phone]  F: [phone] [] [address]  [facility]   [address]   [place]: [phone]  F: [phone]      Physical Therapy Daily Treatment Note    Date:  2020  Patient Name:  Bernardino Escalante    :  1960  MRN: [id number]  Physician: Sunny Crawford D.O. 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     Objective:   Modalities: none  Precautions:   Exercises:  Exercise Reps/ Time Weight/ Level Comments         Nu Step 6min L2                    Alter-G- 12min  1.5-1.8 MPH 60% WB; large shorts          with intervals of rest while in Alter-G   Heel raises 2x10       March in place 2x10           mat      Heel slides 2x10  With light  manual overpressure for flexion   QS 2x10     SAQ 2x10     iso hip ADD 2x10     Bent knee hip ABD 2x10 Lime   added 9/3-HOLD d/t aggravated LBP   SLR + QS 2x10 AAROM Added                sitting      LAQ 2x10ea     March in place 2x10ea                       standing      Step ups 2x10 4\" Lead w L                                     Other:     Specific Instructions for next treatment: Alter-G; Ex to improve Left knee flex; endurance activities;     Treatment Charges: Mins Units   -  Modalities:      [x]  Ther Exercise 30 2   []  Manual Therapy     []  Ther Activities     []  Aquatics     []  Vasocompression     [x]  Other: gait  15 1   Total Treatment time 45 3        Assessment: [x] Progressing toward goals. Initiated session with warm up on Nustep then proceeded to AlterG treadmill. Patient walked for 12 min with a break residential pausing to do her heel raises and marches then continued with the walk. Patient tolerated  Charted exercises well today with the addition of SLR with quad set initiation patient had some difficulty and required AAROM attempt less assistance next session. Also added 10 reps to step ups with good tolerance. Continue working to improve knee ROM and LE strength to improve walking tolerance and decrease pain. [] No change. [] Other:  [x]  Pt would continue to benefit from skilled PT interventions to decrease pain, increase strength, ROM, and overall functional mobility for improved quality of life.           STG: (to be met in 8 treatments) recheck 9/16  1. ? Pain: reduce Left knee pain to 4/10 during gait and ADL's goal met- 2-3/10  2. ? ROM: improve Left knee flex to 75° to assist with normalizing gait  goal met-improved to 104°; extension improved to -6°  3. ? Strength: Left quads to 4+/5  progress made per Pt report-goal ongoing  4. ? Function:improve LEFi score for walking 2 blocks to little difficulty- progress made per Pt report-goal ongoing  5. Patient to be independent with home exercise program as demonstrated by performance with correct form without cues. goal met-Pt has roommate who helps with HEP  6. Demonstrate Knowledge of fall prevention  LTG: (to be met in 16 treatments)  1. Improve getting in/out of car to little difficutly  2. Reduce Left knee pain to 2/10  3. Able to ambulate 2 blocks with no difficulty       Pt.  Education:  [] Yes  [x] No  [] Reviewed Prior HEP/Ed  Method of Education: [] Verbal  [] Demo  [] Written  Comprehension of Education:  [] Verbalizes understanding. [] Demonstrates understanding. [] Needs review. [] Demonstrates/verbalizes HEP/Ed previously given. Plan: [x] Continue current frequency toward long and short term goals.     [] Specific Instructions for subsequent treatments:       Time In: 8:15 AM         Time Out: 9:10 AM    Electronically signed by:  Moses Waterman PTA

## 2020-09-24 ENCOUNTER — APPOINTMENT (OUTPATIENT)
Dept: PHYSICAL THERAPY | Facility: CLINIC | Age: 60
End: 2020-09-24
Payer: COMMERCIAL

## 2020-09-25 ENCOUNTER — HOSPITAL ENCOUNTER (OUTPATIENT)
Dept: PHYSICAL THERAPY | Facility: CLINIC | Age: 60
Setting detail: THERAPIES SERIES
Discharge: HOME | End: 2020-09-25
Payer: COMMERCIAL

## 2020-09-28 ENCOUNTER — HOSPITAL ENCOUNTER (OUTPATIENT)
Dept: PHYSICAL THERAPY | Facility: CLINIC | Age: 60
Setting detail: THERAPIES SERIES
Discharge: HOME | End: 2020-09-28
Payer: COMMERCIAL

## 2020-09-28 ENCOUNTER — HOSPITAL ENCOUNTER (OUTPATIENT)
Age: 60
Setting detail: SPECIMEN
Discharge: HOME | End: 2020-09-28
Payer: COMMERCIAL

## 2020-09-28 LAB
ABSOLUTE EOS #: 0.09 K/UL (ref 0–0.44)
ABSOLUTE IMMATURE GRANULOCYTE: <0.03 K/UL (ref 0–0.3)
ABSOLUTE LYMPH #: 1.46 K/UL (ref 1.1–3.7)
ABSOLUTE MONO #: 0.45 K/UL (ref 0.1–1.2)
BASOPHILS # BLD: 0 % (ref 0–2)
BASOPHILS ABSOLUTE: <0.03 K/UL (ref 0–0.2)
C-REACTIVE PROTEIN: 4 MG/L (ref 0–5)
DIFFERENTIAL TYPE: ABNORMAL
EOSINOPHILS RELATIVE PERCENT: 2 % (ref 1–4)
HCT VFR BLD CALC: 38.7 % (ref 36.3–47.1)
HEMOGLOBIN: 11.6 G/DL (ref 11.9–15.1)
IMMATURE GRANULOCYTES: 0 %
LYMPHOCYTES # BLD: 32 % (ref 24–43)
MCH RBC QN AUTO: 25.4 PG (ref 25.2–33.5)
MCHC RBC AUTO-ENTMCNC: 30 G/DL (ref 28.4–34.8)
MCV RBC AUTO: 84.9 FL (ref 82.6–102.9)
MONOCYTES # BLD: 10 % (ref 3–12)
NRBC AUTOMATED: 0 PER 100 WBC
PDW BLD-RTO: 15.9 % (ref 11.8–14.4)
PLATELET # BLD: 371 K/UL (ref 138–453)
PLATELET ESTIMATE: ABNORMAL
PMV BLD AUTO: 9.9 FL (ref 8.1–13.5)
RBC # BLD: 4.56 M/UL (ref 3.95–5.11)
RBC # BLD: ABNORMAL 10*6/UL
SEDIMENTATION RATE, ERYTHROCYTE: 32 MM (ref 0–30)
SEG NEUTROPHILS: 56 % (ref 36–65)
SEGMENTED NEUTROPHILS ABSOLUTE COUNT: 2.58 K/UL (ref 1.5–8.1)
WBC # BLD: 4.6 K/UL (ref 3.5–11.3)
WBC # BLD: ABNORMAL 10*3/UL

## 2020-09-28 PROCEDURE — 97116 GAIT TRAINING THERAPY: CPT

## 2020-09-28 PROCEDURE — 97110 THERAPEUTIC EXERCISES: CPT

## 2020-09-28 NOTE — FLOWSHEET NOTE
3x20 sec                           Other:     Specific Instructions for next treatment: Alter-G; Ex to improve Left knee flex; endurance activities;     Treatment Charges: Mins Units   -  Modalities:      [x]  Ther Exercise 31 2   []  Manual Therapy     []  Ther Activities     []  Aquatics     []  Vasocompression     [x]  Other: gait  15 1   Total Treatment time 46 3        Assessment: [x] Progressing toward goals. Initiated session with warm up then [facility] for 12 min with a break at 6 min completing heel raises, marches, and added hamstring curls today. Patient had difficulty with marching today due to complaints of hip pain. Added stair stretch and slant board calf stretch with good tolerance. Patient requires further training to improve gait pattern and ability to flex knee in standing (march). Patient has difficulties clearing foot over obstacles. [] No change. [] Other:  [x]  Pt would continue to benefit from skilled PT interventions to decrease pain, increase strength, ROM, and overall functional mobility for improved quality of life.           STG: (to be met in 8 treatments) recheck 9/16  1. ? Pain: reduce Left knee pain to 4/10 during gait and ADL's goal met- 2-3/10  2. ? ROM: improve Left knee flex to 75° to assist with normalizing gait  goal met-improved to 104°; extension improved to -6°  3. ? Strength: Left quads to 4+/5  progress made per Pt report-goal ongoing  4. ? Function:improve LEFi score for walking 2 blocks to little difficulty- progress made per Pt report-goal ongoing  5. Patient to be independent with home exercise program as demonstrated by performance with correct form without cues. goal met-Pt has roommate who helps with HEP  6. Demonstrate Knowledge of fall prevention  LTG: (to be met in 16 treatments)  1. Improve getting in/out of car to little difficutly  2. Reduce Left knee pain to 2/10  3.  Able to ambulate 2 blocks with no difficulty       Pt. Education:  [] Yes  [x] No  [] Reviewed Prior HEP/Ed  Method of Education: [] Verbal  [] Demo  [] Written  Comprehension of Education:  [] Verbalizes understanding. [] Demonstrates understanding. [] Needs review. [] Demonstrates/verbalizes HEP/Ed previously given. Plan: [x] Continue current frequency toward long and short term goals.     [] Specific Instructions for subsequent treatments:       Time In: 8:00 AM         Time Out: 8:51 AM    Electronically signed by:  Oksana Oseguera PTA

## 2020-09-30 ENCOUNTER — HOSPITAL ENCOUNTER (OUTPATIENT)
Dept: PHYSICAL THERAPY | Facility: CLINIC | Age: 60
Setting detail: THERAPIES SERIES
Discharge: HOME | End: 2020-09-30
Payer: COMMERCIAL

## 2020-09-30 PROCEDURE — 97110 THERAPEUTIC EXERCISES: CPT

## 2020-09-30 PROCEDURE — 97116 GAIT TRAINING THERAPY: CPT

## 2020-09-30 NOTE — FLOWSHEET NOTE
[] [address]  [address]  [phone]  F: [phone] [x] [address]  [facility] 36   [address]  P: [phone]  F: [phone] [] [address]  [address] [facility]  P: [phone]  F: [phone] [] [address]  [facility]   [address]   [place]: [phone]  F: [phone]      Physical Therapy Daily Treatment Note    Date:  2020  Patient Name:  Charlene Oviedo    :  1960  MRN: [id number]  Physician: Siena Tobin D.O. 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     Objective:   Modalities: none  Precautions:   Exercises:  Exercise Reps/ Time Weight/ Level Comments         Nu Step 6min L2                    Alter-G- 12min  1.5-1.8 MPH 60% WB; large shorts          with intervals of rest while in Alter-G   Heel raises 2x10       March in place 2x10     Hamstring curl 2x10 LLE          mat      Heel slides 2x10  With light  manual overpressure for flexion   QS 2x10     SAQ 2x10 2# Weight added    iso hip ADD 2x10     Bent knee hip ABD 2x10 Lime   added 93-HOLD d/t aggravated LBP   SLR + QS 10x AROM                sitting      LAQ 2x10ea 2# Weight added    March in place 2x10ea 2# Weight added                      standing      Step ups 2x10 4\" Lead w L   Slant board stretch 3x20 sec reissued HEP  Comprehension of Education:  [] Verbalizes understanding. [] Demonstrates understanding. [] Needs review. [] Demonstrates/verbalizes HEP/Ed previously given. Plan: [x] Continue current frequency toward long and short term goals.     [] Specific Instructions for subsequent treatments:       Time In: 8:02 AM         Time Out: 8:59 AM    Electronically signed by:  Vladislav Lamb PTA

## 2020-10-01 ENCOUNTER — APPOINTMENT (OUTPATIENT)
Dept: PHYSICAL THERAPY | Facility: CLINIC | Age: 60
End: 2020-10-01
Payer: COMMERCIAL

## 2020-10-06 ENCOUNTER — HOSPITAL ENCOUNTER (OUTPATIENT)
Dept: PHYSICAL THERAPY | Facility: CLINIC | Age: 60
Setting detail: THERAPIES SERIES
Discharge: HOME | End: 2020-10-06
Payer: COMMERCIAL

## 2020-10-06 PROCEDURE — 97110 THERAPEUTIC EXERCISES: CPT

## 2020-10-06 PROCEDURE — 97116 GAIT TRAINING THERAPY: CPT

## 2020-10-06 NOTE — FLOWSHEET NOTE
[] [address]  [address]  [phone]  F: [phone] [x] [address]  [facility] 36   [address]  P: [phone]  F: [phone] [] [place]  [address] [facility]  P: [phone]  F: [phone] [] [address]  [facility]   [address]   [place]: [phone]  F: [phone]      Physical Therapy Daily Treatment Note    Date:  10/6/2020  Patient Name:  Chas Bettencourt    :  1960  MRN: [id number]  Physician: Derrell Kwan D.O.                      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                   Rehab Codes: M25.562, M25.662, M79.605, M79.652, M79.662  Onset date:                    Next 's appt.: 10/08/2020  Visit# / total visits:      Cancels/No Shows: 2/0    Subjective:    Pain:  [x] Yes  [] No Location: Left knee Pain Rating: (0-10 scale)  4/10  Pain altered Tx:  [x] No  [] Yes  Action:    Comments: Pt reports Left knee pain is 4/10    Objective:   Modalities: none  Precautions:   Exercises:  Exercise Reps/ Time Weight/ Level Comments         Nu Step 6min L2                    Alter-G- 12min  1.5-1.7 MPH 60% WB; large shorts          with intervals of rest while in Alter-G   Heel raises 2x10       March in place 2x10     Hamstring curl 2x10 LLE          mat      Heel slides 2x10  With light  manual overpressure for flexion   QS 2x10     SAQ 2x10 2# Weight added    iso hip ADD 2x10     Bent knee hip ABD 2x10 Lime   added 9/3-HOLD d/t aggravated LBP   SLR + QS 10x AROM                sitting      LAQ 2x10ea 2# Weight added  in place 2x10ea 2# Weight added                      standing      Step ups 2x10 4\" Lead w L   Slant board stretch 3x20 sec     Stair stretch 3x20 sec                           Other:     Specific Instructions for next treatment: Alter-G; Ex to improve Left knee flex; endurance activities;     Treatment Charges: Mins Units   -  Modalities:      [x]  Ther Exercise 32 2   []  Manual Therapy     []  Ther Activities     []  Aquatics     []  Vasocompression     [x]  Other: gait  12 1   Total Treatment time 44 3        Assessment: [x] Progressing toward goals. Nanette Ernandez has completed 12 physical therapy visits to date; she has used the Alter-G for gait training and notes she is able to ambulate longer distances than before attending therapy. Left knee AROM 0-116; quad strength rated 4/5; Lower extremity functional index score 23/80. [] No change. [] Other:  [x]  Pt would continue to benefit from skilled PT interventions to decrease pain, increase strength, ROM, and overall functional mobility for improved quality of life.            STG: (to be met in 8 treatments) recheck 10/06  1. ? Pain: reduce Left knee pain to 4/10 during gait and ADL's goal met- 2-3/10  2. ? ROM: improve Left knee flex to 75° to assist with normalizing gait  Left knee: flexion to 116°  3. ? Strength: Left quads to 4+/5- progress made-goal ongoing    4. ? Function:improve LEFi score for walking 2 blocks to little difficulty- Pt states she hasn't walked 2 blocks but distance has improved  5. Patient to be independent with home exercise program as demonstrated by performance with correct form without cues. goal met-Demonstrate Knowledge of fall prevention goal met-handout provided  LTG: (to be met in 16 treatments)  1. Improve getting in/out of car to little difficutly doing pretty good per Pt report  2. Reduce Left knee pain to 2/10 not met-rated 4/10  3. Able to ambulate 2 blocks with no difficulty progress made-goal ongoing       Pt.  Education:  [] Yes  [x] No  [] Reviewed Prior HEP/Ed  Method of Education: [] Verbal  [] Demo  [x] Written- reissued HEP  Comprehension of Education:  [] Verbalizes understanding. [] Demonstrates understanding. [] Needs review. [] Demonstrates/verbalizes HEP/Ed previously given. Plan: [x] Continue current frequency toward long and short term goals.     [] Specific Instructions for subsequent treatments:       Time In: 8:30 AM         Time Out: 9:20 AM    Electronically signed by:  Celine Medley PT

## 2020-10-06 NOTE — PROGRESS NOTES
[] [facility]) [facility] [place] &  Therapy  [address]  [phone]  F: [phone] [x] [address]  [employer] [address]   [address]  P: [phone]  F: [phone] [] [place]  [address]  P: [phone]  F: [phone] [] [address]  P: [phone]  F: [phone] [] [address]  [facility]   [address]   [place]: [phone]  F: [phone]      Physical Therapy Progress Note    Date: 10/6/2020      Patient: Rosamaria Arana  : 1960  MRN: [id number]    LUCIAN Tubbs                 Insurance:[facility] Medicaid  Medical Diagnosis: s/p Left knee TKA revision with weakness and instability                   Rehab Codes: M25.562, M25.662, M79.605, M79.652, M79.662  Onset date:                    Next 's appt.: 10/08/2020  Visit# / total visits:                                 Cancels/No Shows: 2/0       Subjective:    Pain:  [x]? Yes  []? No   Location: Left knee     Pain Rating: (0-10 scale)  4/10  Pain altered Tx:  [x]? No  []? Yes  Action:     Comments: Pt reports Left knee pain is 4/10    Objective:  Test Measurements/Function: Jenny Sexton has completed 12 physical therapy visits to date; she has used the Alter-G for gait training and notes she is able to ambulate longer distances than before attending therapy. Left knee AROM 0-116; quad strength rated 4/5; Lower extremity functional index score 23/80.       Assessment:  STG: (to be met in 8 treatments) recheck 10/06  1. ? Pain: reduce Left knee pain to 4/10 during gait and ADL's goal met- 2-3/10  2. ? ROM: improve Left knee flex to 75° to assist with normalizing gait  Left knee: flexion to 116°  3. ? Strength: Left quads to 4+/5- progress made-goal ongoing    4. ? Function:improve LEFi score for walking 2 blocks to little difficulty- Pt states she hasn't walked 2 blocks but distance has improved  5. Patient to be independent with home exercise program as demonstrated by performance with correct form without cues. goal met-Demonstrate Knowledge of fall prevention goal met-handout provided  LTG: (to be met in 16 treatments)  1. Improve getting in/out of car to little difficutly doing pretty good per Pt report  2. Reduce Left knee pain to 2/10 not met-rated 4/10  3. Able to ambulate 2 blocks with no difficulty progress made-goal ongoing          Treatment Plan:  [x] Therapeutic Exercise   87224  [] Iontophoresis: 4 mg/mL Dexamethasone Sodium Phosphate  mAmin  69685   [] Therapeutic Activity  29014 [] Vasopneumatic cold with compression  84266    [x] Gait Training   07152 [] Ultrasound   10672   [] Neuromuscular Re-education  66679 [] Electrical Stimulation Unattended  47879   [] Manual Therapy  55180 [] Electrical Stimulation Attended  50773   [x] Instruction in HEP  [] Lumbar/Cervical Traction  79221   [] Aquatic Therapy   74522 [] Cold/hotpack    [] Massage   51910      [] Dry Needling, 1 or 2 muscles  82759   [] Biofeedback, first 15 minutes   67616  [] Biofeedback, additional 15 minutes   93509 [] Dry Needling, 3 or more muscles  85398       Patient Status:       [x] Continue per initial plan of care. 4 visits remaining-to follow up in ortho 10/08/2020       Electronically signed by Johnathon Del Rio PT on 10/6/2020 at 8:07 AM      If you have any questions or concerns, please don't hesitate to call. Thank you for your referral.    Physician Signature:________________________________Date:__________________  By signing above or cosigning this note, I have reviewed this plan of care and certify a need for medically necessary rehabilitation services.      *PLEASE SIGN ABOVE AND FAX BACK ALL PAGES*

## 2020-10-08 ENCOUNTER — OFFICE VISIT (OUTPATIENT)
Dept: ORTHOPEDIC SURGERY | Age: 60
End: 2020-10-08
Payer: COMMERCIAL

## 2020-10-08 VITALS
TEMPERATURE: 97.5 F | HEART RATE: 64 BPM | SYSTOLIC BLOOD PRESSURE: 114 MMHG | HEIGHT: 61 IN | DIASTOLIC BLOOD PRESSURE: 68 MMHG | WEIGHT: 172 LBS | BODY MASS INDEX: 32.47 KG/M2

## 2020-10-08 PROCEDURE — 99213 OFFICE O/P EST LOW 20 MIN: CPT | Performed by: STUDENT IN AN ORGANIZED HEALTH CARE EDUCATION/TRAINING PROGRAM

## 2020-10-08 PROCEDURE — 1036F TOBACCO NON-USER: CPT | Performed by: STUDENT IN AN ORGANIZED HEALTH CARE EDUCATION/TRAINING PROGRAM

## 2020-10-08 PROCEDURE — G8417 CALC BMI ABV UP PARAM F/U: HCPCS | Performed by: STUDENT IN AN ORGANIZED HEALTH CARE EDUCATION/TRAINING PROGRAM

## 2020-10-08 PROCEDURE — G8427 DOCREV CUR MEDS BY ELIG CLIN: HCPCS | Performed by: STUDENT IN AN ORGANIZED HEALTH CARE EDUCATION/TRAINING PROGRAM

## 2020-10-08 PROCEDURE — G8484 FLU IMMUNIZE NO ADMIN: HCPCS | Performed by: STUDENT IN AN ORGANIZED HEALTH CARE EDUCATION/TRAINING PROGRAM

## 2020-10-08 PROCEDURE — 3017F COLORECTAL CA SCREEN DOC REV: CPT | Performed by: STUDENT IN AN ORGANIZED HEALTH CARE EDUCATION/TRAINING PROGRAM

## 2020-10-08 NOTE — PROGRESS NOTES
[facility]  [facility]  [address] [facility] [address]  Dept: [phone]  Dept Fax: [phone]        Ambulatory Follow-Up    Subjective:   Bigg Bonilla is a 61y.o. year old female who presents to our office today for routine followup regarding:   her   1. Pain in both knees, unspecified chronicity    2. Status post revision of total knee replacement, bilateral    .    Chief Complaint   Patient presents with    Follow-up     bilateral knee pain     HPI  Ms. Deena Toledo is a 55-year-old -American female presenting today for follow-up regarding bilateral knee pain, left greater than right. Patient has a history of multiple bilateral knee surgeries including total knee arthroplasty and revision total knee arthroplasty. Her most recent surgery was a revision left total knee arthroplasty with a constrained prosthesis in 2015. 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 At last clinic visit on 8/13/2020 patient was endorsing radiating burning and tingling pain as well as feelings of occasional leg heaviness down the thighs starting at the back as well as occasional feelings of buckling to the left knee. At that time overall no significant osteolysis changes were noted on x-ray and implants and knee stability was stable on clinical exam so the patient was referred for physical therapy, particularly utilizing alter G treadmill during therapy sessions. Patient states that her motion and feelings of strength to the left knee have improved significantly since starting physical therapy, but she continues to endorse radiating pain down both legs described as burning and tingling with some occasional feelings of heaviness in the legs. She was also referred for basic inflammatory/infection work-up given her history of multiple surgeries and anytime chronic pain is noted infection is always a risk.  She states that these radiating leg symptoms are worse with prolonged standing and walking and improve with sitting down. Denies any other significant changes and at this time has completed her prescribed formal physical therapy sessions. Patient has seen Dr. Joan Mcknight in the past and states that she plans to schedule a follow-up with him pending discussion at today's office visit. Review of Systems   Constitutional: Negative for fever and chills. HENT: Negative for congestion. Eyes: Negative for blurred vision and double vision. Respiratory: Negative for cough, shortness of breath and wheezing. Cardiovascular: Negative for chest pain and palpitations. Gastrointestinal: Negative for nausea. Negative for vomiting. Musculoskeletal: Positive for myalgias, pain in both knees, occasional buckling left knee, . Skin: Negative for itching and rash. Neurological: Negative for dizziness, sensory change and headaches. Psychiatric/Behavioral: Negative for depression and suicidal ideas. I have reviewed the CC, HPI, ROS, PMH, FHX, Social History. I agree with the documentation provided by other staff, residents, and/or medical students and have reviewed their documentation prior to providing my signature indicating agreement. Objective :   /68   Pulse 64   Temp 97.5 °F (36.4 °C)   Ht 5' 1\" (1.549 m)   Wt 172 lb (78 kg)   BMI 32.50 kg/m²   General: AAOx3, NAD, appears stated age  Ortho Exam  MSK:  R knee: Well-healed midline surgical incision from prior interventions. No significant tenderness palpation about the incision or the knee joint line. Active range of motion 0 to 130 degrees. Stable to varus and valgus stress. No significant shifting or instability with full passive range of motion. Sensation intact light touch about knee and distally. Gross motor intact. Leg warm and well perfused, 2+ popliteal pulse. L knee: Well-healed midline surgical incision from prior interventions. Mild tenderness palpation noted along the medial joint line.  Prior extension lag from previous notes seem to improve improved today, passive range of motion 1 to 2 degrees to 100. Stable to varus and valgus stress. No significant instability appreciated from full passive extension to deep flexion. Sensation intact to light touch about knee and distally. Gross motor intact. CV: no obvious JVD, no dependent edema, distal pulses 2+  Respiratory: chest rise symmetric, unlabored respirations, no audible wheezing  Skin: warm, well perfused, no obvious rashes or lesions  Psych: patient displays understanding of exam, diagnosis, and plan. Radiology:   No new imaging obtained today    Labs (9/28/20):  ESR 32 (ref range 0-30) , CRP 4, WBC 4.6    Assessment:      1. Pain in both knees, unspecified chronicity    2. Status post revision of total knee replacement, bilateral       Plan:      -Patient motion and feelings of strength have improved significantly with physical therapy intervention, particularly the alter G treadmill intervention. Patient plans to continue home therapy exercises. Reviewed laboratory markers with the patient, given her history of baseline inflammatory condition with a lupus anticoagulant mild elevation of ESR just outside of reference range with negative CRP and white blood cell count makes concern for infection very low. So stable radiographs over time with no significant change in osteolysis. -Regarding the patient's occasional feelings of left knee feeling weak or giving way as well as bilateral burning and tingling leg pain with feelings of leg heaviness, we discussed that the symptoms seem consistent with radicular pain and neurogenic claudication from spinal stenosis.  Patient has seen Dr. Ramesh Franks in the past and plan at this point would be to schedule follow-up with him for evaluation and any potential interventions regarding the spine as this could also potentially benefit the feelings of legs getting heavy or giving way as well as her radicular pain symptoms.  -Also briefly reviewed with the patient that her feelings of a clunk or subjective instability could potentially be addressed in the future with a revision from a constrained prosthesis to a hinged total knee arthroplasty. Reviewed risks associated as well as invasiveness of the surgery with additional bone resection and postoperative recovery. At this time patient has improved and continues to do overall functionally well with recent physical therapy. Patient expressed understanding of this. She is agreeable to the plan of continued home therapy and evaluation by Dr. Mary Ann Purvis.  -Follow-up as needed at this time regarding her knees. Could consider repeat formal physical therapy if desired. Follow up:Return if symptoms worsen or fail to improve. No orders of the defined types were placed in this encounter. No orders of the defined types were placed in this encounter.     Electronically signed by Myrna Duran DO on 10/8/2020 at 9:13 AM

## 2020-12-08 ENCOUNTER — HOSPITAL ENCOUNTER (EMERGENCY)
Age: 60
Discharge: LEFT BEFORE DISCHARGE | End: 2020-12-08
Payer: COMMERCIAL

## 2020-12-08 ENCOUNTER — APPOINTMENT (OUTPATIENT)
Dept: GENERAL RADIOLOGY | Age: 60
End: 2020-12-08
Payer: COMMERCIAL

## 2020-12-08 VITALS
BODY MASS INDEX: 32.1 KG/M2 | OXYGEN SATURATION: 97 % | TEMPERATURE: 97.4 F | SYSTOLIC BLOOD PRESSURE: 172 MMHG | WEIGHT: 170 LBS | DIASTOLIC BLOOD PRESSURE: 92 MMHG | HEIGHT: 61 IN | HEART RATE: 80 BPM | RESPIRATION RATE: 18 BRPM

## 2020-12-08 ASSESSMENT — PAIN SCALES - GENERAL: PAINLEVEL_OUTOF10: 7

## 2020-12-08 ASSESSMENT — PAIN DESCRIPTION - ORIENTATION: ORIENTATION: LEFT

## 2020-12-08 ASSESSMENT — PAIN DESCRIPTION - PAIN TYPE: TYPE: ACUTE PAIN

## 2020-12-08 ASSESSMENT — PAIN DESCRIPTION - LOCATION: LOCATION: KNEE

## 2020-12-13 ENCOUNTER — APPOINTMENT (OUTPATIENT)
Dept: GENERAL RADIOLOGY | Age: 60
End: 2020-12-13
Payer: COMMERCIAL

## 2020-12-13 ENCOUNTER — HOSPITAL ENCOUNTER (EMERGENCY)
Age: 60
Discharge: HOME | End: 2020-12-13
Attending: EMERGENCY MEDICINE
Payer: COMMERCIAL

## 2020-12-13 VITALS
RESPIRATION RATE: 16 BRPM | HEART RATE: 68 BPM | WEIGHT: 170 LBS | DIASTOLIC BLOOD PRESSURE: 66 MMHG | HEIGHT: 61 IN | TEMPERATURE: 97.5 F | BODY MASS INDEX: 32.1 KG/M2 | SYSTOLIC BLOOD PRESSURE: 108 MMHG | OXYGEN SATURATION: 100 %

## 2020-12-13 PROCEDURE — 73562 X-RAY EXAM OF KNEE 3: CPT

## 2020-12-13 PROCEDURE — 99282 EMERGENCY DEPT VISIT SF MDM: CPT

## 2020-12-13 ASSESSMENT — ENCOUNTER SYMPTOMS
DIARRHEA: 0
PHOTOPHOBIA: 0
ABDOMINAL DISTENTION: 0
CONSTIPATION: 0
ABDOMINAL PAIN: 0
RHINORRHEA: 0
WHEEZING: 0
BACK PAIN: 0
VOMITING: 0
SHORTNESS OF BREATH: 0
NAUSEA: 0
CHEST TIGHTNESS: 0
SORE THROAT: 0
TROUBLE SWALLOWING: 0

## 2020-12-13 ASSESSMENT — PAIN DESCRIPTION - LOCATION: LOCATION: KNEE

## 2020-12-13 ASSESSMENT — PAIN DESCRIPTION - ORIENTATION: ORIENTATION: LEFT

## 2020-12-13 ASSESSMENT — PAIN SCALES - GENERAL: PAINLEVEL_OUTOF10: 7

## 2020-12-13 NOTE — ED PROVIDER NOTES
[address] [facility]     Emergency Department     Faculty Attestation    I performed a history and physical examination of the patient and discussed management with the resident. I have reviewed and agree with the residents findings including all diagnostic interpretations, and treatment plans as written at the time of my review. Any areas of disagreement are noted on the chart. I was personally present for the key portions of any procedures. I have documented in the chart those procedures where I was not present during the key portions. For Physician Assistant/ Nurse Practitioner cases/documentation I have personally evaluated this patient and have completed at least one if not all key elements of the E/M (history, physical exam, and MDM). Additional findings are as noted. This patient was evaluated in the Emergency Department for symptoms described in the history of present illness. The patient was evaluated in the context of the global COVID-19 pandemic, which necessitated consideration that the patient might be at risk for infection with the SARS-CoV-2 virus that causes COVID-19. Institutional protocols and algorithms that pertain to the evaluation of patients at risk for COVID-19 are in a state of rapid change based on information released by regulatory bodies including the CDC and federal and state organizations. These policies and algorithms were followed during the patient's care in the ED. Primary Care Physician: Lilibeth Javier MD    History: This is a 61 y.o. female who presents to the Emergency Department with complaint of left knee pain. Patient has had previous surgeries on that same knee. 3 days ago she felt the buckle and is concerned that some of the hardware may have shifted. Physical:   height is 5' 1\" (1.549 m) and weight is 170 lb (77.1 kg). Her infrared temperature is 97.5 °F (36.4 °C).  Her blood pressure is 108/66 and her pulse Virtual visit for patient conducted via three way video conference with myself, patient and Dr. Christie ([place]).   I reviewed the history and abbreviated physical exam and discussed the management with Dr. Christie ([place]) on 7/14/2020. I reviewed the note and there are no changes to the past medical, family or social history.  A complete 10 point review of systems was obtained. Please see the HPI for pertinent positives and negatives. All other systems were reviewed and were found to be negative. I agree with the documented findings and plan of care as outlined.    Since June 26th, has been on vancomycin (tapered down). Symptoms have improved.     Plan at this point is to continue on suppressive vancomycin for about 6 weeks.  We will then re-check a fecal calprotectin.  If it is normal, that suggests that C diff is driving most of her inflammation.  If it remains elevated, then likely underlying IBD is still active and C diff is either colonization, or superinfection secondary to underlying inflammation.  In that case, likely move forward with long term vancomycin suppression and optimize management of her underlying colitis.     is 68. Her respiration is 16 and oxygen saturation is 100%. Tenderness to palpation in the left knee well-healed scar. No erythema no warmth. Impression: Knee pain    Plan: Analgesia, x-ray      (Please note that portions of this note were completed with a voice recognition program.  Efforts were made to edit the dictations but occasionally words are mis-transcribed.)    Chelsey Dumont.  Hortensia Shields MD, [facility]  Attending Emergency Medicine Physician        Minal Bassett MD  12/13/20 [id number]

## 2020-12-13 NOTE — ED PROVIDER NOTES
[facility] ED  Emergency Department Encounter  Emergency Medicine Resident     Pt Name: Isamar Duckworth  MRN: [id number]  Ayogfdana 1960  Date of evaluation: 12/13/20  PCP:  Victor Manuel Marquez MD    [address] [place] [address]       Chief Complaint   Patient presents with    Knee Pain       HISTORY [facility]  (Location/Symptom, Timing/Onset, Context/Setting, Quality, Duration, Modifying Deyvi Common.)      Isamar Duckworth is a 61 y.o. female who presents with concerns for knee pain. Patient was seen by physical therapy with concerns for bilateral knee pain left greater than right which have been going on for years, patient has a history of multiple bilateral knee surgeries including a total knee arthroplasty and revision, with her most recent surgery being a revision of her left total knee with a constrained prosthesis in 2015. Patient also has a history of a PJ I to the left knee. Patient has been seen on multiple instances by physical therapy who noted to have occasional leg heaviness, thighs as well as concerns for buckling of the left knee. Patient finished physical therapy on 10 /14/2020. PAST MEDICAL / SURGICAL / SOCIAL / FAMILY HISTORY      has a past medical history of Asthma, Back pain, Diabetes mellitus ([address] GERD (gastroesophageal reflux disease), Glaucoma, Hyperlipidemia, Hypertension, and Osteoarthritis. has a past surgical history that includes joint replacement (Bilateral); back surgery; Tonsillectomy; Appendectomy; Hysterectomy; and lumbar fusion.      Social History     Socioeconomic History    Marital status:      Spouse name: Not on file    Number of children: Not on file    Years of education: Not on file    Highest education level: Not on file   Occupational History    Not on file   Social Needs    Financial resource strain: Not on file    Food insecurity     Worry: Not on file     Inability: Not on file    Transportation needs     Medical: Not on file     Non-medical: Not on file   Tobacco Use    Smoking status: Former Smoker     Packs/day: 0.25     Years: 10.00     Pack years: 2.50     Types: Cigarettes     Quit date:      Years since quittin.9    Smokeless tobacco: Never Used   Substance and Sexual Activity    Alcohol use: No    Drug use: Not on file    Sexual activity: Yes     Partners: Male   Lifestyle    Physical activity     Days per week: Not on file     Minutes per session: Not on file    Stress: Not on file   Relationships    Social connections     Talks on phone: Not on file     Gets together: Not on file     Attends Worship service: Not on file     Active member of club or organization: Not on file     Attends meetings of clubs or organizations: Not on file     Relationship status: Not on file    Intimate partner violence     Fear of current or ex partner: Not on file     Emotionally abused: Not on file     Physically abused: Not on file     Forced sexual activity: Not on file   Other Topics Concern    Not on file   Social History Narrative    Not on file       History reviewed. No pertinent family history. Allergies:  Ace inhibitors, Adhesive tape, Chocolate, and Compazine [prochlorperazine maleate]    Home Medications:  Prior to Admission medications    Medication Sig Start Date End Date Taking?  Authorizing Provider   diclofenac sodium (VOLTAREN) 1 % GEL Apply 2 g topically 2 times daily 20   Eduin Bejarano MD   acetaminophen (TYLENOL) 325 MG tablet Take 2 tablets by mouth every 6 hours as needed for Pain  Patient not taking: Reported on 10/8/2020 5/8/20   Eduin Bejarano MD   vitamin B-12 (CYANOCOBALAMIN) 1000 MCG tablet  10/17/19   Historical Provider, MD   ferrous sulfate 325 (65 Fe) MG EC tablet  10/17/19   Historical Provider, MD   fluticasone (FLONASE) 50 MCG/ACT nasal spray fluticasone propionate 50 mcg/actuation nasal spray,suspension    Historical Provider, MD   metFORMIN (GLUCOPHAGE) 500 MG tablet  10/17/19   Historical Provider, MD   PARoxetine (PAXIL) 10 MG tablet  10/10/19   Historical Provider, MD   polyethylene glycol (GAVILAX) powder Gavilax 17 gram/dose oral powder    Historical Provider, MD   timolol (TIMOPTIC) 0.25 % ophthalmic solution  8/18/19   Historical Provider, MD   traZODone (DESYREL) 50 MG tablet  10/10/19   Historical Provider, MD   albuterol (PROVENTIL) (5 MG/ML) 0.5% nebulizer solution Take 2.5 mg by nebulization every 6 hours as needed for Wheezing. Historical Provider, MD   docusate sodium (COLACE) 100 MG capsule Take 100 mg by mouth daily. Historical Provider, MD   losartan-hydrochlorothiazide (HYZAAR) 100-25 MG per tablet Take 1 tablet by mouth daily. Historical Provider, MD   latanoprost (XALATAN) 0.005 % ophthalmic solution 1 drop nightly. Historical Provider, MD   atorvastatin (LIPITOR) 20 MG tablet Take 20 mg by mouth daily. Historical Provider, MD   amLODIPine (NORVASC) 5 MG tablet Take 5 mg by mouth daily. Historical Provider, MD   oxyCODONE-acetaminophen (PERCOCET) 5-325 MG per tablet Take 1 tablet by mouth every 6 hours as needed for Pain. Historical Provider, MD   Budesonide-Formoterol Fumarate (SYMBICORT IN) Inhale  into the lungs daily as needed. Historical Provider, MD       REVIEW OFSYSTEMS    (2-9 systems for level 4, 10 or more for level 5)      Review of Systems   Constitutional: Negative for chills, fatigue and fever. HENT: Negative for hearing loss, rhinorrhea, sneezing, sore throat, tinnitus and trouble swallowing. Eyes: Negative for photophobia and visual disturbance. Respiratory: Negative for chest tightness, shortness of breath and wheezing. Cardiovascular: Negative for chest pain and palpitations. Gastrointestinal: Negative for abdominal distention, abdominal pain, constipation, diarrhea, nausea and vomiting. Endocrine: Negative for polyuria.    Genitourinary: Negative for dysuria, flank pain, frequency, vaginal bleeding and vaginal discharge. Musculoskeletal: Positive for arthralgias (Left knee pain). Negative for back pain, gait problem and neck pain. Neurological: Negative for dizziness, tremors, seizures, syncope, facial asymmetry, numbness and headaches. Psychiatric/Behavioral: Negative for self-injury and suicidal ideas. PHYSICAL EXAM   (up to 7 for level 4, 8 or more forlevel 5)      INITIAL VITALS:   ED Triage Vitals   BP Temp Temp Source Pulse Resp SpO2 Height Weight   12/13/20 1048 12/13/20 1041 12/13/20 1041 12/13/20 1048 12/13/20 1048 12/13/20 1048 12/13/20 1048 12/13/20 1048   108/66 97.5 °F (36.4 °C) Infrared 68 16 100 % 5' 1\" (1.549 m) 170 lb (77.1 kg)       Physical Exam  Constitutional:       General: She is not in acute distress. Appearance: She is obese. She is not toxic-appearing or diaphoretic. HENT:      Head: Normocephalic and atraumatic. Eyes:      Extraocular Movements: Extraocular movements intact. Neck:      Musculoskeletal: No neck rigidity or muscular tenderness. Cardiovascular:      Rate and Rhythm: Normal rate and regular rhythm. Pulses: Normal pulses. Pulmonary:      Effort: No respiratory distress. Breath sounds: Normal breath sounds. No wheezing. Abdominal:      General: There is no distension. Palpations: Abdomen is soft. Tenderness: There is no abdominal tenderness. Musculoskeletal: Normal range of motion. General: Swelling (Mild effusion noted at the proximal patella of the left lower extremity) and tenderness present. No deformity. Comments: Full range of motion of the left lower extremity at the knee, pain with valgus stress, no pain with varus stress, Lachman, anterior posterior drawer normal without laxity   Skin:     General: Skin is dry. Neurological:      General: No focal deficit present. Mental Status: She is oriented to person, place, and time.    Psychiatric:         Mood and Affect: Mood normal. Behavior: Behavior normal.         Thought Content: Thought content normal.         Judgment: Judgment normal.         DIFFERENTIAL  DIAGNOSIS     PLAN (LABS / IMAGING / EKG):  Orders Placed This Encounter   Procedures    XR KNEE LEFT (3 VIEWS)       MEDICATIONS ORDERED:  No orders of the defined types were placed in this encounter. DDX: MCL, ACL injury, patellar bursitis, knee effusion, traumatic arthralgia, hardware loosening,    Initial MDM/Plan/ED COURSE:    61 y.o. female who presents with a severe left knee pain after a fall which occurred 3 days ago with concerns flex at the knee and pain with weightbearing. Patient is concerned that her prosthetic knee could have had some hardware malpositioning, loosening, fracture, plan to get an x-ray in order to assess for acute pathology. X-ray shows potential loosening of the hardware the left lower extremity at the knee, spoke with orthopedic surgery who stated that they would prefer for patient to follow-up with Dr. Qasim Oconnor in the next week, patient updated, states that she will call for follow-up. Patient wrapped in Ace bandage, dates that she has oral analgesia at home. Patient cleared for discharge with follow-up. :     DIAGNOSTIC RESULTS / EMERGENCYDEPARTMENT COURSE / MDM     LABS:  Labs Reviewed - No data to display        Xr Knee Left (3 Views)    Result Date: 12/13/2020  EXAMINATION: THREE XRAY VIEWS OF THE LEFT KNEE 12/13/2020 12:01 pm COMPARISON: 08/13/2020 HISTORY: ORDERING SYSTEM PROVIDED HISTORY: concern for laxity, movement of prosthesis TECHNOLOGIST PROVIDED HISTORY: concern for laxity, movement of prosthesis Reason for Exam: concern for laxity, movement of prosthesis Acuity: Acute Type of Exam: Initial 17-year-old female with concern for laxity and movement of prosthesis FINDINGS: Appears to be periprosthetic lucency surrounding the cement of the revision left knee arthroplasty hardware which measures up to 3 mm.   This can be seen in the setting of hardware loosening. Distal femoral and proximal tibial components appear intact. Metallic radiodensities are seen about the left knee which could be related from hardware breakdown. There is cortical thickening involving the proximal tibia and distal femur which could also be related to hardware motion. 1. Revision left knee arthroplasty hardware with periprosthetic lucency measuring up to 3 mm as well as cortical thickening which can be seen in the setting of hardware loosening. Metallic radiodensities about the left knee which can be seen from metallic hardware breakdown. 2. Hardware itself appears intact. *    PROCEDURES:  None    CONSULTS:  None    CRITICAL CARE:  Please see attending note    FINAL IMPRESSION      1.  Acute pain of left knee         DISPOSITION / PLAN     DISPOSITION Decision To Discharge 12/13/2020 01:10:46 PM      PATIENT REFERRED TO:  [facility] ED  [facility]  [phone]    As needed    Brandi Kim, [address] [phone]    In 1 week  for follow-up with potential [address] [facility] [address], DO  [address] [facility] [address]  [address]  [place] [phone]    In 1 week        DISCHARGE MEDICATIONS:  Discharge Medication List as of 12/13/2020  1:11 PM          Lalito Durand MD  Emergency Medicine Resident    (Please note that portions of this note were completed with a voice recognition program.Efforts were made to edit the dictations but occasionally words are mis-transcribed.)       Lalito Durand MD  Resident  12/13/20 [id number]

## 2020-12-17 ENCOUNTER — OFFICE VISIT (OUTPATIENT)
Dept: ORTHOPEDIC SURGERY | Age: 60
End: 2020-12-17
Payer: COMMERCIAL

## 2020-12-17 VITALS — WEIGHT: 170 LBS | BODY MASS INDEX: 32.1 KG/M2 | HEIGHT: 61 IN

## 2020-12-17 PROCEDURE — G8417 CALC BMI ABV UP PARAM F/U: HCPCS | Performed by: STUDENT IN AN ORGANIZED HEALTH CARE EDUCATION/TRAINING PROGRAM

## 2020-12-17 PROCEDURE — G8428 CUR MEDS NOT DOCUMENT: HCPCS | Performed by: STUDENT IN AN ORGANIZED HEALTH CARE EDUCATION/TRAINING PROGRAM

## 2020-12-17 PROCEDURE — 99213 OFFICE O/P EST LOW 20 MIN: CPT | Performed by: STUDENT IN AN ORGANIZED HEALTH CARE EDUCATION/TRAINING PROGRAM

## 2020-12-17 PROCEDURE — 1036F TOBACCO NON-USER: CPT | Performed by: STUDENT IN AN ORGANIZED HEALTH CARE EDUCATION/TRAINING PROGRAM

## 2020-12-17 PROCEDURE — 3017F COLORECTAL CA SCREEN DOC REV: CPT | Performed by: STUDENT IN AN ORGANIZED HEALTH CARE EDUCATION/TRAINING PROGRAM

## 2020-12-17 PROCEDURE — G8484 FLU IMMUNIZE NO ADMIN: HCPCS | Performed by: STUDENT IN AN ORGANIZED HEALTH CARE EDUCATION/TRAINING PROGRAM

## 2020-12-17 NOTE — PROGRESS NOTES
Neuro: alert. oriented  Eyes: Extra-ocular muscles intact  Mouth: Oral mucosa moist. No perioral lesions  Pulm: Respirations unlabored and regular. Skin: warm, well perfused  Psych:   Patient has good fund of knowledge and displays understanging of exam, diagnosis, and plan. MSK: Left knee: Surgical guards present without any surrounding erythema. No obvious joint effusion. Significant quadricep atrophy on the left compared to the right knee. 4-/5 strength with resisted extension of the knee. Mild medial patellar tenderness. Range of motion 5 degrees to 120 degrees without a significant painful arc. Radiology:   Images from December 13 reviewed demonstrating a left knee hinged prosthesis without any changes in alignment. No obvious evidence of implant loosening. No fractures. Assessment:      1. Knee buckling, left       Plan: We feel the patient's symptoms are likely due to her significant quadriceps atrophy on the left side. She did note that she had significant benefit from physical therapy in the past and she did not notice her knee buckling while she was doing physical therapy, but since physical therapy has stopped she noticed more weakness on that side. We would like her to restart physical therapy to include Alter G treadmill and quadricep specific strengthening, as well as gait training and balance training. We will also provide the patient a prescription for a cane for mobilization to prevent her from falling until she can regain some of her strength. Additionally will provide the patient with a hinged brace for knee stability while she continues to work on strengthening. Call with any questions or concerns. Counseled on symptoms of infection, she understands to call if she develops any fevers, chills, swelling, redness, or warmth. Follow-up in 3 months for repeat clinical exam.      Follow up:No follow-ups on file.     Orders Placed This Encounter   Medications  Misc. Devices MISC     Sig: Cane     Dispense:  1 Device     Refill:  0    Misc. Devices MISC     Sig: Functional hinged knee brace     Dispense:  1 Device     Refill:  0          Orders Placed This Encounter   Procedures   [address] [facility] Physical Therapy [facility]     Referral Priority:   Routine     Referral Type:   Eval and Treat     Referral Reason:   Specialty Services Required     Requested Specialty:   Physical Therapy     Number of Visits Requested:   1           --------------------------------------------------------------  Katia Squires DO, PGY-4  [facility]) Orthopedic Surgery   3:49 PM 12/17/20      Please excuse any typos/errors, as this note was created with the assistance of voice recognition software. While intending to generate a document that actually reflects the content of the visit, the document can still have some errors including those of syntax and sound-a-like substitutions which may escape proof reading. In such instances, actual meaning can be extrapolated by context.

## 2020-12-18 NOTE — PROGRESS NOTES
I performed a history and physical examination of the patient and discussed management with the resident. I reviewed the physician assistant/resident physician note and agree with the documented findings and plan of care. Any areas of disagreement are noted on the chart. I have personally evaluated this patient and have completed at least one if not all key elements of the E/M (history, physical exam, and MDM). Additional findings are as noted. I agree with the chief complaint, past medical history, past surgical history, allergies, medications, social and family history as documented unless otherwise noted below.      Electronically signed by France Alvarado DO on 12/18/2020 at 6:56 AM

## 2020-12-28 ENCOUNTER — TELEPHONE (OUTPATIENT)
Dept: ORTHOPEDIC SURGERY | Age: 60
End: 2020-12-28